# Patient Record
Sex: MALE | Race: WHITE | NOT HISPANIC OR LATINO | Employment: OTHER | URBAN - NONMETROPOLITAN AREA
[De-identification: names, ages, dates, MRNs, and addresses within clinical notes are randomized per-mention and may not be internally consistent; named-entity substitution may affect disease eponyms.]

---

## 2020-10-09 ENCOUNTER — PREP FOR SURGERY (OUTPATIENT)
Dept: OTHER | Facility: HOSPITAL | Age: 70
End: 2020-10-09

## 2020-10-09 ENCOUNTER — TRANSCRIBE ORDERS (OUTPATIENT)
Dept: ADMINISTRATIVE | Facility: HOSPITAL | Age: 70
End: 2020-10-09

## 2020-10-09 ENCOUNTER — OFFICE VISIT (OUTPATIENT)
Dept: CARDIOLOGY | Facility: CLINIC | Age: 70
End: 2020-10-09

## 2020-10-09 VITALS
SYSTOLIC BLOOD PRESSURE: 144 MMHG | WEIGHT: 238 LBS | HEART RATE: 59 BPM | BODY MASS INDEX: 32.23 KG/M2 | HEIGHT: 72 IN | DIASTOLIC BLOOD PRESSURE: 80 MMHG | OXYGEN SATURATION: 98 %

## 2020-10-09 DIAGNOSIS — I20.0 UNSTABLE ANGINA PECTORIS (HCC): ICD-10-CM

## 2020-10-09 DIAGNOSIS — E78.2 MIXED HYPERLIPIDEMIA: Primary | ICD-10-CM

## 2020-10-09 DIAGNOSIS — Z01.818 PRE-OP TESTING: Primary | ICD-10-CM

## 2020-10-09 DIAGNOSIS — I20.0 UNSTABLE ANGINA PECTORIS (HCC): Primary | ICD-10-CM

## 2020-10-09 DIAGNOSIS — R00.2 PALPITATIONS: ICD-10-CM

## 2020-10-09 PROCEDURE — 93000 ELECTROCARDIOGRAM COMPLETE: CPT | Performed by: INTERNAL MEDICINE

## 2020-10-09 PROCEDURE — 99204 OFFICE O/P NEW MOD 45 MIN: CPT | Performed by: INTERNAL MEDICINE

## 2020-10-09 RX ORDER — FUROSEMIDE 20 MG/1
20 TABLET ORAL DAILY
COMMUNITY
Start: 2020-09-22

## 2020-10-09 RX ORDER — NITROGLYCERIN 0.1MG/HR
1 PATCH, TRANSDERMAL 24 HOURS TRANSDERMAL AS NEEDED
COMMUNITY
Start: 2020-09-29 | End: 2022-02-09

## 2020-10-09 RX ORDER — LIDOCAINE HYDROCHLORIDE 10 MG/ML
0.1 INJECTION, SOLUTION EPIDURAL; INFILTRATION; INTRACAUDAL; PERINEURAL ONCE AS NEEDED
Status: CANCELLED | OUTPATIENT
Start: 2020-10-09

## 2020-10-09 RX ORDER — LISINOPRIL 10 MG/1
10 TABLET ORAL DAILY
COMMUNITY
Start: 2020-09-22

## 2020-10-09 RX ORDER — ALBUTEROL SULFATE 90 UG/1
2 AEROSOL, METERED RESPIRATORY (INHALATION) EVERY 4 HOURS PRN
COMMUNITY

## 2020-10-09 RX ORDER — PRAVASTATIN SODIUM 40 MG
40 TABLET ORAL DAILY
COMMUNITY
Start: 2020-09-22

## 2020-10-09 RX ORDER — CARVEDILOL 3.12 MG/1
3.12 TABLET ORAL 2 TIMES DAILY
COMMUNITY
Start: 2020-09-22

## 2020-10-09 RX ORDER — DIPHENHYDRAMINE HCL 25 MG
25 CAPSULE ORAL ONCE
Status: CANCELLED | OUTPATIENT
Start: 2020-10-09 | End: 2020-10-09

## 2020-10-09 RX ORDER — SODIUM CHLORIDE 9 MG/ML
125 INJECTION, SOLUTION INTRAVENOUS ONCE
Status: CANCELLED | OUTPATIENT
Start: 2020-10-09 | End: 2020-10-09

## 2020-10-09 RX ORDER — ASPIRIN 81 MG/1
81 TABLET ORAL DAILY
COMMUNITY

## 2020-10-09 NOTE — PROGRESS NOTES
Referring Provider: Vipin Polo DO    Reason for Consultation: chest pain    Chief complaint:   Chief Complaint   Patient presents with   • New Patient     referred by Dr. Woodruff for evaluation of freq pvc, chest pain, htn, sob, possible cath   • Irregular Heart Beat     PVC's  pt wore a monitor that showed this.   • Chest Pain     pt says its a dull ache that comes on after he gets sob.  he has no energy and has to rest a lot.  no radiation.  left arm feels weak at times.   • Hypertension     pt states this goes up and down   • Shortness of Breath     pt states he is sob all the time.       Subjective .     History of present illness:  Mohit Holm is a 69 y.o. yo male with history of COPD who has been referred by Dr. Woodruff for cath. He complains of exertional chest pain and [place] for 2 mo. He had an abnormal nuclear stress test indicating possible anterior ischemia. He is also having very frequent PVC's.   Chief Complaint   Patient presents with   • New Patient     referred by Dr. Woodruff for evaluation of freq pvc, chest pain, htn, sob, possible cath   • Irregular Heart Beat     PVC's  pt wore a monitor that showed this.   • Chest Pain     pt says its a dull ache that comes on after he gets sob.  he has no energy and has to rest a lot.  no radiation.  left arm feels weak at times.   • Hypertension     pt states this goes up and down   • Shortness of Breath     pt states he is sob all the time.   .    History  Past Medical History:   Diagnosis Date   • Arrhythmia    • Cancer (CMS/HCC)     colon   • COPD (chronic obstructive pulmonary disease) (CMS/HCC)    • Hyperlipidemia    • Hypertension    ,   Past Surgical History:   Procedure Laterality Date   • BACK SURGERY     • COLON RESECTION     ,   Family History   Problem Relation Age of Onset   • Lung disease Mother    • Heart disease Father    • No Known Problems Sister    • No Known Problems Brother    • No Known Problems Sister    • No Known Problems  "Brother    ,   Social History     Tobacco Use   • Smoking status: Former Smoker     Types: Cigarettes     Quit date:      Years since quittin.7   • Smokeless tobacco: Former User     Quit date:    Substance Use Topics   • Alcohol use: Not Currently     Comment: quit    • Drug use: Never   ,     Medications  Current Outpatient Medications   Medication Sig Dispense Refill   • albuterol sulfate  (90 Base) MCG/ACT inhaler Inhale 2 puffs Every 4 (Four) Hours As Needed for Wheezing.     • aspirin 81 MG EC tablet Take 81 mg by mouth Daily.     • carvedilol (COREG) 3.125 MG tablet 2 (two) times a day.     • esomeprazole (nexIUM) 20 MG capsule Take 20 mg by mouth Every Morning Before Breakfast.     • furosemide (LASIX) 20 MG tablet Daily.     • lisinopril (PRINIVIL,ZESTRIL) 10 MG tablet Daily.     • nitroglycerin (NITRODUR) 0.1 MG/HR patch As Needed.     • pravastatin (PRAVACHOL) 40 MG tablet Daily.     • ProAir  (90 Base) MCG/ACT inhaler As Needed.     • Trelegy Ellipta 100-62.5-25 MCG/INH aerosol powder  Daily.       No current facility-administered medications for this visit.         Allergies:  Patient has no known allergies.    Review of Systems  Review of Systems   Constitution: Positive for malaise/fatigue.   HENT: Negative for nosebleeds.    Cardiovascular: Positive for chest pain, dyspnea on exertion and irregular heartbeat. Negative for claudication, leg swelling, near-syncope, orthopnea, palpitations, paroxysmal nocturnal dyspnea and syncope.   Respiratory: Negative for cough, hemoptysis and shortness of breath.    Gastrointestinal: Negative for dysphagia, hematemesis and melena.   Genitourinary: Negative for hematuria.   All other systems reviewed and are negative.      Objective     Physical Exam:  /80   Pulse 59   Ht 182.9 cm (72\")   Wt 108 kg (238 lb)   SpO2 98%   BMI 32.28 kg/m²   Constitutional:       General: Not in acute distress.     Appearance: Not diaphoretic. "   Eyes:      General: No scleral icterus.  HENT:      Head: Normocephalic and atraumatic.   Neck:      Musculoskeletal: Normal range of motion and neck supple.      Vascular: No carotid bruit.   Pulmonary:      Effort: Pulmonary effort is normal. No respiratory distress.      Breath sounds: Normal breath sounds. No wheezing. No rales.   Cardiovascular:      PMI at left midclavicular line. Bradycardia present. Regular rhythm.      No gallop.   Edema:     Peripheral edema absent.   Abdominal:      General: Bowel sounds are normal. There is no distension.      Palpations: Abdomen is soft.      Tenderness: There is no abdominal tenderness.   Skin:     General: Skin is warm and dry.      Findings: No erythema.   Neurological:      Mental Status: Alert and oriented to person, place, and time.   Psychiatric:         Behavior: Behavior normal.         Results Review:   I reviewed the patient's new clinical results.    ECG 12 Lead    Date/Time: 10/9/2020 9:58 AM  Performed by: Lonny Wesley MD  Authorized by: Lonny Wesley MD   Comparison: not compared with previous ECG   Previous ECG: no previous ECG available  Rhythm: sinus rhythm  Ectopy: infrequent PVCs  Rate: normal  Conduction: conduction normal  ST Segments: ST segments normal  T Waves: T waves normal  QRS axis: normal    Clinical impression: non-specific ECG            No results found for any previous visit.       Assessment/Plan   Mohit was seen today for new patient, irregular heart beat, chest pain, hypertension and shortness of breath.    Diagnoses and all orders for this visit:    Mixed hyperlipidemia, Patient's statin therapy is followed by Dr. Woodruff.    Unstable angina pectoris (CMS/[facility]), will schedule cath next week    Palpitations, if they persist will refer to EP    Other orders  -     ECG 12 Lead

## 2020-10-12 ENCOUNTER — LAB (OUTPATIENT)
Dept: LAB | Facility: HOSPITAL | Age: 70
End: 2020-10-12

## 2020-10-12 PROCEDURE — C9803 HOPD COVID-19 SPEC COLLECT: HCPCS | Performed by: INTERNAL MEDICINE

## 2020-10-12 PROCEDURE — U0003 INFECTIOUS AGENT DETECTION BY NUCLEIC ACID (DNA OR RNA); SEVERE ACUTE RESPIRATORY SYNDROME CORONAVIRUS 2 (SARS-COV-2) (CORONAVIRUS DISEASE [COVID-19]), AMPLIFIED PROBE TECHNIQUE, MAKING USE OF HIGH THROUGHPUT TECHNOLOGIES AS DESCRIBED BY CMS-2020-01-R: HCPCS | Performed by: INTERNAL MEDICINE

## 2020-10-13 LAB
COVID LABCORP PRIORITY: NORMAL
SARS-COV-2 RNA RESP QL NAA+PROBE: NOT DETECTED

## 2020-10-28 ENCOUNTER — TRANSCRIBE ORDERS (OUTPATIENT)
Dept: ADMINISTRATIVE | Facility: HOSPITAL | Age: 70
End: 2020-10-28

## 2020-10-28 DIAGNOSIS — Z01.818 PREOPERATIVE TESTING: Primary | ICD-10-CM

## 2020-11-02 ENCOUNTER — LAB (OUTPATIENT)
Dept: LAB | Facility: HOSPITAL | Age: 70
End: 2020-11-02

## 2020-11-02 DIAGNOSIS — Z01.818 PREOPERATIVE TESTING: ICD-10-CM

## 2020-11-02 PROCEDURE — C9803 HOPD COVID-19 SPEC COLLECT: HCPCS

## 2020-11-02 PROCEDURE — 87635 SARS-COV-2 COVID-19 AMP PRB: CPT | Performed by: INTERNAL MEDICINE

## 2020-11-03 ENCOUNTER — TELEPHONE (OUTPATIENT)
Dept: CARDIOLOGY | Facility: CLINIC | Age: 70
End: 2020-11-03

## 2020-11-03 LAB — SARS-COV-2 N GENE RESP QL NAA+PROBE: DETECTED

## 2020-11-03 NOTE — TELEPHONE ENCOUNTER
[facility] called to report the patient's Covid test from yesterday is positive.  They are asking us to reschedule the heart cath for tomorrow.  Thank you!

## 2020-12-03 ENCOUNTER — TRANSCRIBE ORDERS (OUTPATIENT)
Dept: ADMINISTRATIVE | Facility: HOSPITAL | Age: 70
End: 2020-12-03

## 2020-12-03 DIAGNOSIS — Z01.818 PRE-OP TESTING: Primary | ICD-10-CM

## 2020-12-07 ENCOUNTER — LAB (OUTPATIENT)
Dept: LAB | Facility: HOSPITAL | Age: 70
End: 2020-12-07

## 2020-12-07 PROCEDURE — U0003 INFECTIOUS AGENT DETECTION BY NUCLEIC ACID (DNA OR RNA); SEVERE ACUTE RESPIRATORY SYNDROME CORONAVIRUS 2 (SARS-COV-2) (CORONAVIRUS DISEASE [COVID-19]), AMPLIFIED PROBE TECHNIQUE, MAKING USE OF HIGH THROUGHPUT TECHNOLOGIES AS DESCRIBED BY CMS-2020-01-R: HCPCS | Performed by: INTERNAL MEDICINE

## 2020-12-07 PROCEDURE — C9803 HOPD COVID-19 SPEC COLLECT: HCPCS | Performed by: INTERNAL MEDICINE

## 2020-12-08 LAB
COVID LABCORP PRIORITY: NORMAL
SARS-COV-2 RNA RESP QL NAA+PROBE: NOT DETECTED

## 2020-12-10 ENCOUNTER — HOSPITAL ENCOUNTER (OUTPATIENT)
Facility: HOSPITAL | Age: 70
Discharge: HOME | End: 2020-12-10
Attending: INTERNAL MEDICINE | Admitting: INTERNAL MEDICINE

## 2020-12-10 VITALS
BODY MASS INDEX: 31.75 KG/M2 | TEMPERATURE: 98.5 F | HEIGHT: 72 IN | RESPIRATION RATE: 16 BRPM | SYSTOLIC BLOOD PRESSURE: 144 MMHG | DIASTOLIC BLOOD PRESSURE: 88 MMHG | WEIGHT: 234.4 LBS | OXYGEN SATURATION: 96 % | HEART RATE: 79 BPM

## 2020-12-10 DIAGNOSIS — I20.0 UNSTABLE ANGINA PECTORIS (HCC): ICD-10-CM

## 2020-12-10 LAB
ALBUMIN SERPL-MCNC: 4.7 G/DL (ref 3.5–5.2)
ALBUMIN/GLOB SERPL: 1.7 G/DL
ALP SERPL-CCNC: 90 U/L (ref 39–117)
ALT SERPL W P-5'-P-CCNC: 14 U/L (ref 1–41)
ANION GAP SERPL CALCULATED.3IONS-SCNC: 10 MMOL/L (ref 5–15)
AST SERPL-CCNC: 15 U/L (ref 1–40)
BASOPHILS # BLD AUTO: 0.07 10*3/MM3 (ref 0–0.2)
BASOPHILS NFR BLD AUTO: 0.8 % (ref 0–1.5)
BILIRUB SERPL-MCNC: 0.7 MG/DL (ref 0–1.2)
BUN SERPL-MCNC: 19 MG/DL (ref 8–23)
BUN/CREAT SERPL: 26.8 (ref 7–25)
CALCIUM SPEC-SCNC: 9.1 MG/DL (ref 8.6–10.5)
CHLORIDE SERPL-SCNC: 104 MMOL/L (ref 98–107)
CO2 SERPL-SCNC: 26 MMOL/L (ref 22–29)
CREAT SERPL-MCNC: 0.71 MG/DL (ref 0.76–1.27)
DEPRECATED RDW RBC AUTO: 40.4 FL (ref 37–54)
EOSINOPHIL # BLD AUTO: 0.15 10*3/MM3 (ref 0–0.4)
EOSINOPHIL NFR BLD AUTO: 1.6 % (ref 0.3–6.2)
ERYTHROCYTE [DISTWIDTH] IN BLOOD BY AUTOMATED COUNT: 12.8 % (ref 12.3–15.4)
GFR SERPL CREATININE-BSD FRML MDRD: 110 ML/MIN/1.73
GLOBULIN UR ELPH-MCNC: 2.8 GM/DL
GLUCOSE SERPL-MCNC: 115 MG/DL (ref 65–99)
HCT VFR BLD AUTO: 43.3 % (ref 37.5–51)
HGB BLD-MCNC: 15.4 G/DL (ref 13–17.7)
IMM GRANULOCYTES # BLD AUTO: 0.02 10*3/MM3 (ref 0–0.05)
IMM GRANULOCYTES NFR BLD AUTO: 0.2 % (ref 0–0.5)
LYMPHOCYTES # BLD AUTO: 2.33 10*3/MM3 (ref 0.7–3.1)
LYMPHOCYTES NFR BLD AUTO: 25.2 % (ref 19.6–45.3)
MCH RBC QN AUTO: 31 PG (ref 26.6–33)
MCHC RBC AUTO-ENTMCNC: 35.6 G/DL (ref 31.5–35.7)
MCV RBC AUTO: 87.1 FL (ref 79–97)
MONOCYTES # BLD AUTO: 0.92 10*3/MM3 (ref 0.1–0.9)
MONOCYTES NFR BLD AUTO: 9.9 % (ref 5–12)
NEUTROPHILS NFR BLD AUTO: 5.76 10*3/MM3 (ref 1.7–7)
NEUTROPHILS NFR BLD AUTO: 62.3 % (ref 42.7–76)
NRBC BLD AUTO-RTO: 0 /100 WBC (ref 0–0.2)
PLATELET # BLD AUTO: 252 10*3/MM3 (ref 140–450)
PMV BLD AUTO: 9 FL (ref 6–12)
POTASSIUM SERPL-SCNC: 4.2 MMOL/L (ref 3.5–5.2)
PROT SERPL-MCNC: 7.5 G/DL (ref 6–8.5)
RBC # BLD AUTO: 4.97 10*6/MM3 (ref 4.14–5.8)
SODIUM SERPL-SCNC: 140 MMOL/L (ref 136–145)
WBC # BLD AUTO: 9.25 10*3/MM3 (ref 3.4–10.8)

## 2020-12-10 PROCEDURE — 80053 COMPREHEN METABOLIC PANEL: CPT | Performed by: INTERNAL MEDICINE

## 2020-12-10 PROCEDURE — 25010000002 FENTANYL CITRATE (PF) 100 MCG/2ML SOLUTION: Performed by: INTERNAL MEDICINE

## 2020-12-10 PROCEDURE — S0260 H&P FOR SURGERY: HCPCS | Performed by: INTERNAL MEDICINE

## 2020-12-10 PROCEDURE — 93458 L HRT ARTERY/VENTRICLE ANGIO: CPT | Performed by: INTERNAL MEDICINE

## 2020-12-10 PROCEDURE — 99152 MOD SED SAME PHYS/QHP 5/>YRS: CPT | Performed by: INTERNAL MEDICINE

## 2020-12-10 PROCEDURE — C1894 INTRO/SHEATH, NON-LASER: HCPCS | Performed by: INTERNAL MEDICINE

## 2020-12-10 PROCEDURE — C1760 CLOSURE DEV, VASC: HCPCS | Performed by: INTERNAL MEDICINE

## 2020-12-10 PROCEDURE — C1769 GUIDE WIRE: HCPCS | Performed by: INTERNAL MEDICINE

## 2020-12-10 PROCEDURE — 25010000002 DIPHENHYDRAMINE PER 50 MG: Performed by: INTERNAL MEDICINE

## 2020-12-10 PROCEDURE — 0 IOPAMIDOL PER 1 ML: Performed by: INTERNAL MEDICINE

## 2020-12-10 PROCEDURE — 85025 COMPLETE CBC W/AUTO DIFF WBC: CPT | Performed by: INTERNAL MEDICINE

## 2020-12-10 PROCEDURE — 25010000002 HEPARIN (PORCINE) 1000-0.9 UT/500ML-% SOLUTION: Performed by: INTERNAL MEDICINE

## 2020-12-10 PROCEDURE — 25010000002 HEPARIN (PORCINE) 2000-0.9 UNIT/L-% SOLUTION: Performed by: INTERNAL MEDICINE

## 2020-12-10 PROCEDURE — 25010000002 MIDAZOLAM HCL (PF) 5 MG/5ML SOLUTION: Performed by: INTERNAL MEDICINE

## 2020-12-10 RX ORDER — LIDOCAINE HYDROCHLORIDE 20 MG/ML
INJECTION, SOLUTION INFILTRATION; PERINEURAL AS NEEDED
Status: DISCONTINUED | OUTPATIENT
Start: 2020-12-10 | End: 2020-12-10 | Stop reason: HOSPADM

## 2020-12-10 RX ORDER — HEPARIN SODIUM 200 [USP'U]/100ML
INJECTION, SOLUTION INTRAVENOUS AS NEEDED
Status: DISCONTINUED | OUTPATIENT
Start: 2020-12-10 | End: 2020-12-10 | Stop reason: HOSPADM

## 2020-12-10 RX ORDER — SODIUM CHLORIDE 9 MG/ML
125 INJECTION, SOLUTION INTRAVENOUS ONCE
Status: COMPLETED | OUTPATIENT
Start: 2020-12-10 | End: 2020-12-10

## 2020-12-10 RX ORDER — DIPHENHYDRAMINE HYDROCHLORIDE 50 MG/ML
INJECTION INTRAMUSCULAR; INTRAVENOUS AS NEEDED
Status: DISCONTINUED | OUTPATIENT
Start: 2020-12-10 | End: 2020-12-10 | Stop reason: HOSPADM

## 2020-12-10 RX ORDER — ACETAMINOPHEN 325 MG/1
650 TABLET ORAL EVERY 4 HOURS PRN
Status: DISCONTINUED | OUTPATIENT
Start: 2020-12-10 | End: 2020-12-10 | Stop reason: HOSPADM

## 2020-12-10 RX ORDER — FENTANYL CITRATE 50 UG/ML
INJECTION, SOLUTION INTRAMUSCULAR; INTRAVENOUS AS NEEDED
Status: DISCONTINUED | OUTPATIENT
Start: 2020-12-10 | End: 2020-12-10 | Stop reason: HOSPADM

## 2020-12-10 RX ORDER — LIDOCAINE HYDROCHLORIDE 10 MG/ML
0.1 INJECTION, SOLUTION EPIDURAL; INFILTRATION; INTRACAUDAL; PERINEURAL ONCE AS NEEDED
Status: DISCONTINUED | OUTPATIENT
Start: 2020-12-10 | End: 2020-12-10 | Stop reason: HOSPADM

## 2020-12-10 RX ORDER — SODIUM CHLORIDE 9 MG/ML
100 INJECTION, SOLUTION INTRAVENOUS CONTINUOUS
Status: DISCONTINUED | OUTPATIENT
Start: 2020-12-10 | End: 2020-12-10 | Stop reason: HOSPADM

## 2020-12-10 RX ORDER — MIDAZOLAM HYDROCHLORIDE 1 MG/ML
INJECTION, SOLUTION INTRAMUSCULAR; INTRAVENOUS AS NEEDED
Status: DISCONTINUED | OUTPATIENT
Start: 2020-12-10 | End: 2020-12-10 | Stop reason: HOSPADM

## 2020-12-10 RX ADMIN — SODIUM CHLORIDE 125 ML/HR: 9 INJECTION, SOLUTION INTRAVENOUS at 10:39

## 2020-12-10 NOTE — H&P
No chief complaint on file.      Subjective .     History of present illness:  Mohit Holm is a 70 y.o. yo male with history of exertional chest pain and abnormal nuclear stress test who presents today for cath. He was originally scheduled back in October but his wife got Covid and then he got it. He was asymptomatic during that time. He still has CP with exertion.    History  Past Medical History:   Diagnosis Date   • Arrhythmia    • Cancer (CMS/HCC)     colon   • COPD (chronic obstructive pulmonary disease) (CMS/HCC)    • Hyperlipidemia    • Hypertension    ,   Past Surgical History:   Procedure Laterality Date   • BACK SURGERY     • COLON RESECTION     ,   Family History   Problem Relation Age of Onset   • Lung disease Mother    • Heart disease Father    • No Known Problems Sister    • No Known Problems Brother    • No Known Problems Sister    • No Known Problems Brother    ,   Social History     Tobacco Use   • Smoking status: Former Smoker     Types: Cigarettes     Quit date:      Years since quittin.9   • Smokeless tobacco: Former User     Quit date:    Substance Use Topics   • Alcohol use: Not Currently     Comment: quit    • Drug use: Never   ,     Medications  Current Facility-Administered Medications   Medication Dose Route Frequency Provider Last Rate Last Admin   • lidocaine PF 1% (XYLOCAINE) injection 0.1 mL  0.1 mL Intradermal Once PRN Lonny Wesley MD       • sodium chloride 0.9 % infusion  125 mL/hr Intravenous Once Lonny Wesley MD           Allergies:  Patient has no known allergies.    Review of Systems  Review of Systems   HENT: Negative for nosebleeds.    Cardiovascular: Positive for chest pain and dyspnea on exertion. Negative for claudication, irregular heartbeat, leg swelling, near-syncope, orthopnea, palpitations, paroxysmal nocturnal dyspnea and syncope.   Respiratory: Negative for cough, hemoptysis and shortness of breath.    Gastrointestinal: Negative for dysphagia,  "hematemesis and melena.   Genitourinary: Negative for hematuria.   All other systems reviewed and are negative.      Objective     Physical Exam:  Patient Vitals for the past 24 hrs:   BP Temp Temp src Pulse Resp SpO2 Height Weight   12/10/20 1028 118/63 98.5 °F (36.9 °C) Temporal 78 13 96 % 182.9 cm (72\") 106 kg (234 lb 6.4 oz)     Constitutional:       General: Not in acute distress.     Appearance: Not diaphoretic.   Eyes:      General: No scleral icterus.  HENT:      Head: Normocephalic and atraumatic.   Neck:      Musculoskeletal: Normal range of motion and neck supple.      Vascular: No carotid bruit.   Pulmonary:      Effort: Pulmonary effort is normal. No respiratory distress.      Breath sounds: Normal breath sounds. No wheezing. No rales.   Cardiovascular:      PMI at left midclavicular line. Normal rate. Regular rhythm.      No gallop.   Edema:     Peripheral edema absent.   Abdominal:      General: Bowel sounds are normal. There is no distension.      Palpations: Abdomen is soft.      Tenderness: There is no abdominal tenderness.   Skin:     General: Skin is warm and dry.      Findings: No erythema.   Neurological:      Mental Status: Alert and oriented to person, place, and time.   Psychiatric:         Behavior: Behavior normal.         Results Review:   I reviewed the patient's new clinical results.  Lab Results (last 24 hours)     Procedure Component Value Units Date/Time    Comprehensive Metabolic Panel [[id number]] Collected: 12/10/20 1017    Specimen: Blood Updated: 12/10/20 1028    CBC & Differential [[id number]] Collected: 12/10/20 1017    Specimen: Blood Updated: 12/10/20 1028    Narrative:      The following orders were created for panel order CBC & Differential.  Procedure                               Abnormality         Status                     ---------                               -----------         ------                     CBC Auto Differential[[id number]]                            In " process                   Please view results for these tests on the individual orders.    CBC Auto Differential [[id number]] Collected: 12/10/20 1017    Specimen: Blood Updated: 12/10/20 1028        Imaging Results (Last 24 Hours)     ** No results found for the last 24 hours. **        No results found for: ECHOEFEST  I have reviewed his ECG which reveals SR with no acute ischemic changes    Assessment/Plan     Unstable angina pectoris (CMS/HCC)    New Problems that need assessment:  1. unstable angina, plan cath today. The risk and potential complications as well as anticipated benefits were discussed with the patient and he wishes to proceed with the planned procedure    Stable Problems  1. HTN  2. HLD  3. PVC's  4. Recent Covid infection    MDM High Complexity

## 2021-03-10 ENCOUNTER — HOSPITAL ENCOUNTER (OUTPATIENT)
Dept: GENERAL RADIOLOGY | Age: 71
Discharge: HOME | End: 2021-03-10
Payer: MEDICARE

## 2021-03-10 ENCOUNTER — HOSPITAL ENCOUNTER (OUTPATIENT)
Dept: PREADMISSION TESTING | Age: 71
Discharge: HOME | End: 2021-03-14
Payer: MEDICARE

## 2021-03-10 VITALS — HEIGHT: 72 IN | BODY MASS INDEX: 32.51 KG/M2 | WEIGHT: 240 LBS

## 2021-03-10 LAB
ABO/RH: NORMAL
ALBUMIN SERPL-MCNC: 3.8 G/DL (ref 3.5–5.2)
ALP BLD-CCNC: 91 U/L (ref 40–130)
ALT SERPL-CCNC: 19 U/L (ref 5–41)
ANION GAP SERPL CALCULATED.3IONS-SCNC: 7 MMOL/L (ref 7–19)
ANTIBODY SCREEN: NORMAL
APTT: 27.4 SEC (ref 26–36.2)
AST SERPL-CCNC: 18 U/L (ref 5–40)
BASOPHILS ABSOLUTE: 0.1 K/UL (ref 0–0.2)
BASOPHILS RELATIVE PERCENT: 0.6 % (ref 0–1)
BILIRUB SERPL-MCNC: 0.5 MG/DL (ref 0.2–1.2)
BILIRUBIN URINE: NEGATIVE
BLOOD, URINE: NEGATIVE
BUN BLDV-MCNC: 11 MG/DL (ref 8–23)
CALCIUM SERPL-MCNC: 8.8 MG/DL (ref 8.8–10.2)
CHLORIDE BLD-SCNC: 105 MMOL/L (ref 98–111)
CLARITY: CLEAR
CO2: 29 MMOL/L (ref 22–29)
COLOR: YELLOW
CREAT SERPL-MCNC: 0.9 MG/DL (ref 0.5–1.2)
EKG P AXIS: 57 DEGREES
EKG P-R INTERVAL: 214 MS
EKG Q-T INTERVAL: 418 MS
EKG QRS DURATION: 86 MS
EKG QTC CALCULATION (BAZETT): 428 MS
EKG T AXIS: 54 DEGREES
EOSINOPHILS ABSOLUTE: 0.2 K/UL (ref 0–0.6)
EOSINOPHILS RELATIVE PERCENT: 3 % (ref 0–5)
GFR AFRICAN AMERICAN: >59
GFR NON-AFRICAN AMERICAN: >60
GLUCOSE BLD-MCNC: 70 MG/DL (ref 74–109)
GLUCOSE URINE: NEGATIVE MG/DL
HCT VFR BLD CALC: 43.4 % (ref 42–52)
HEMOGLOBIN: 14.3 G/DL (ref 14–18)
IMMATURE GRANULOCYTES #: 0 K/UL
INR BLD: 0.95 (ref 0.88–1.18)
KETONES, URINE: NEGATIVE MG/DL
LEUKOCYTE ESTERASE, URINE: NEGATIVE
LYMPHOCYTES ABSOLUTE: 2.4 K/UL (ref 1.1–4.5)
LYMPHOCYTES RELATIVE PERCENT: 30.4 % (ref 20–40)
MCH RBC QN AUTO: 30.9 PG (ref 27–31)
MCHC RBC AUTO-ENTMCNC: 32.9 G/DL (ref 33–37)
MCV RBC AUTO: 93.7 FL (ref 80–94)
MONOCYTES ABSOLUTE: 1 K/UL (ref 0–0.9)
MONOCYTES RELATIVE PERCENT: 12 % (ref 0–10)
NEUTROPHILS ABSOLUTE: 4.3 K/UL (ref 1.5–7.5)
NEUTROPHILS RELATIVE PERCENT: 53.5 % (ref 50–65)
NITRITE, URINE: NEGATIVE
PDW BLD-RTO: 13.6 % (ref 11.5–14.5)
PH UA: 6.5 (ref 5–8)
PLATELET # BLD: 247 K/UL (ref 130–400)
PMV BLD AUTO: 10 FL (ref 9.4–12.4)
POTASSIUM SERPL-SCNC: 4.4 MMOL/L (ref 3.5–5)
PROTEIN UA: NEGATIVE MG/DL
PROTHROMBIN TIME: 12.5 SEC (ref 12–14.6)
RBC # BLD: 4.63 M/UL (ref 4.7–6.1)
SODIUM BLD-SCNC: 141 MMOL/L (ref 136–145)
SPECIFIC GRAVITY UA: 1.01 (ref 1–1.03)
TOTAL PROTEIN: 6.7 G/DL (ref 6.6–8.7)
UROBILINOGEN, URINE: 0.2 E.U./DL
WBC # BLD: 8 K/UL (ref 4.8–10.8)

## 2021-03-10 PROCEDURE — 86901 BLOOD TYPING SEROLOGIC RH(D): CPT

## 2021-03-10 PROCEDURE — 80053 COMPREHEN METABOLIC PANEL: CPT

## 2021-03-10 PROCEDURE — 93005 ELECTROCARDIOGRAM TRACING: CPT | Performed by: ORTHOPAEDIC SURGERY

## 2021-03-10 PROCEDURE — 86900 BLOOD TYPING SEROLOGIC ABO: CPT

## 2021-03-10 PROCEDURE — 86850 RBC ANTIBODY SCREEN: CPT

## 2021-03-10 PROCEDURE — 85025 COMPLETE CBC W/AUTO DIFF WBC: CPT

## 2021-03-10 PROCEDURE — 71046 X-RAY EXAM CHEST 2 VIEWS: CPT

## 2021-03-10 PROCEDURE — 87081 CULTURE SCREEN ONLY: CPT

## 2021-03-10 PROCEDURE — 85610 PROTHROMBIN TIME: CPT

## 2021-03-10 PROCEDURE — 81003 URINALYSIS AUTO W/O SCOPE: CPT

## 2021-03-10 PROCEDURE — 85730 THROMBOPLASTIN TIME PARTIAL: CPT

## 2021-03-10 RX ORDER — PREGABALIN 75 MG/1
75 CAPSULE ORAL ONCE
Status: CANCELLED | OUTPATIENT
Start: 2021-03-22

## 2021-03-10 RX ORDER — ALBUTEROL SULFATE 90 UG/1
2 AEROSOL, METERED RESPIRATORY (INHALATION) EVERY 6 HOURS PRN
COMMUNITY

## 2021-03-10 RX ORDER — DEXAMETHASONE SODIUM PHOSPHATE 10 MG/ML
10 INJECTION, SOLUTION INTRAMUSCULAR; INTRAVENOUS ONCE
Status: CANCELLED | OUTPATIENT
Start: 2021-03-22

## 2021-03-10 RX ORDER — ASPIRIN 81 MG/1
81 TABLET ORAL DAILY
Status: ON HOLD | COMMUNITY
End: 2021-03-23 | Stop reason: HOSPADM

## 2021-03-10 RX ORDER — PRAVASTATIN SODIUM 40 MG
40 TABLET ORAL DAILY
COMMUNITY

## 2021-03-10 RX ORDER — ESOMEPRAZOLE MAGNESIUM 20 MG/1
20 FOR SUSPENSION ORAL DAILY
COMMUNITY

## 2021-03-10 RX ORDER — ACETAMINOPHEN 500 MG
1000 TABLET ORAL ONCE
Status: CANCELLED | OUTPATIENT
Start: 2021-03-22

## 2021-03-10 RX ORDER — FLUTICASONE FUROATE, UMECLIDINIUM BROMIDE AND VILANTEROL TRIFENATATE 100; 62.5; 25 UG/1; UG/1; UG/1
1 POWDER RESPIRATORY (INHALATION) DAILY
COMMUNITY

## 2021-03-10 RX ORDER — FUROSEMIDE 20 MG/1
20 TABLET ORAL DAILY
COMMUNITY

## 2021-03-10 RX ORDER — CARVEDILOL 3.12 MG/1
3.12 TABLET ORAL 2 TIMES DAILY WITH MEALS
COMMUNITY

## 2021-03-10 RX ORDER — OXYCODONE HCL 10 MG/1
10 TABLET, FILM COATED, EXTENDED RELEASE ORAL ONCE
Status: CANCELLED | OUTPATIENT
Start: 2021-03-22

## 2021-03-10 RX ORDER — CELECOXIB 100 MG/1
100 CAPSULE ORAL ONCE
Status: CANCELLED | OUTPATIENT
Start: 2021-03-22

## 2021-03-11 LAB — MRSA CULTURE ONLY: NORMAL

## 2021-03-18 ENCOUNTER — HOSPITAL ENCOUNTER (OUTPATIENT)
Dept: PREADMISSION TESTING | Age: 71
Discharge: HOME | End: 2021-03-22
Payer: MEDICARE

## 2021-03-20 LAB — SARS-COV-2, NAA: NOT DETECTED

## 2021-03-22 ENCOUNTER — HOSPITAL ENCOUNTER (OUTPATIENT)
Age: 71
Setting detail: OBSERVATION
Discharge: HOME HEALTH | End: 2021-03-23
Attending: ORTHOPAEDIC SURGERY | Admitting: ORTHOPAEDIC SURGERY
Payer: MEDICARE

## 2021-03-22 ENCOUNTER — APPOINTMENT (OUTPATIENT)
Dept: GENERAL RADIOLOGY | Age: 71
End: 2021-03-22
Attending: ORTHOPAEDIC SURGERY
Payer: MEDICARE

## 2021-03-22 ENCOUNTER — ANESTHESIA EVENT (OUTPATIENT)
Dept: OPERATING ROOM | Age: 71
End: 2021-03-22
Payer: MEDICARE

## 2021-03-22 ENCOUNTER — ANESTHESIA (OUTPATIENT)
Dept: OPERATING ROOM | Age: 71
End: 2021-03-22
Payer: MEDICARE

## 2021-03-22 VITALS — SYSTOLIC BLOOD PRESSURE: 95 MMHG | TEMPERATURE: 97 F | DIASTOLIC BLOOD PRESSURE: 56 MMHG | OXYGEN SATURATION: 95 %

## 2021-03-22 DIAGNOSIS — M17.11 PRIMARY OSTEOARTHRITIS OF RIGHT KNEE: Primary | ICD-10-CM

## 2021-03-22 PROBLEM — M17.10 ARTHRITIS OF KNEE: Status: ACTIVE | Noted: 2021-03-22

## 2021-03-22 LAB
ABO/RH: NORMAL
ANTIBODY SCREEN: NORMAL

## 2021-03-22 PROCEDURE — 3700000000 HC ANESTHESIA ATTENDED CARE: Performed by: ORTHOPAEDIC SURGERY

## 2021-03-22 PROCEDURE — 2580000003 HC RX 258: Performed by: ORTHOPAEDIC SURGERY

## 2021-03-22 PROCEDURE — 64447 NJX AA&/STRD FEMORAL NRV IMG: CPT

## 2021-03-22 PROCEDURE — 6360000002 HC RX W HCPCS: Performed by: ORTHOPAEDIC SURGERY

## 2021-03-22 PROCEDURE — 86901 BLOOD TYPING SEROLOGIC RH(D): CPT

## 2021-03-22 PROCEDURE — C1776 JOINT DEVICE (IMPLANTABLE): HCPCS | Performed by: ORTHOPAEDIC SURGERY

## 2021-03-22 PROCEDURE — C9290 INJ, BUPIVACAINE LIPOSOME: HCPCS | Performed by: ORTHOPAEDIC SURGERY

## 2021-03-22 PROCEDURE — 2700000000 HC OXYGEN THERAPY PER DAY

## 2021-03-22 PROCEDURE — 2500000003 HC RX 250 WO HCPCS: Performed by: ANESTHESIOLOGY

## 2021-03-22 PROCEDURE — 36415 COLL VENOUS BLD VENIPUNCTURE: CPT

## 2021-03-22 PROCEDURE — 86900 BLOOD TYPING SEROLOGIC ABO: CPT

## 2021-03-22 PROCEDURE — C1713 ANCHOR/SCREW BN/BN,TIS/BN: HCPCS | Performed by: ORTHOPAEDIC SURGERY

## 2021-03-22 PROCEDURE — 6370000000 HC RX 637 (ALT 250 FOR IP): Performed by: ORTHOPAEDIC SURGERY

## 2021-03-22 PROCEDURE — 2580000003 HC RX 258: Performed by: ANESTHESIOLOGY

## 2021-03-22 PROCEDURE — 3700000001 HC ADD 15 MINUTES (ANESTHESIA): Performed by: ORTHOPAEDIC SURGERY

## 2021-03-22 PROCEDURE — 2500000003 HC RX 250 WO HCPCS: Performed by: NURSE ANESTHETIST, CERTIFIED REGISTERED

## 2021-03-22 PROCEDURE — 73560 X-RAY EXAM OF KNEE 1 OR 2: CPT

## 2021-03-22 PROCEDURE — 2720000010 HC SURG SUPPLY STERILE: Performed by: ORTHOPAEDIC SURGERY

## 2021-03-22 PROCEDURE — G0378 HOSPITAL OBSERVATION PER HR: HCPCS

## 2021-03-22 PROCEDURE — 6360000002 HC RX W HCPCS: Performed by: ANESTHESIOLOGY

## 2021-03-22 PROCEDURE — 86850 RBC ANTIBODY SCREEN: CPT

## 2021-03-22 PROCEDURE — 3600000015 HC SURGERY LEVEL 5 ADDTL 15MIN: Performed by: ORTHOPAEDIC SURGERY

## 2021-03-22 PROCEDURE — 3600000005 HC SURGERY LEVEL 5 BASE: Performed by: ORTHOPAEDIC SURGERY

## 2021-03-22 PROCEDURE — 2500000003 HC RX 250 WO HCPCS: Performed by: ORTHOPAEDIC SURGERY

## 2021-03-22 PROCEDURE — 2500000003 HC RX 250 WO HCPCS

## 2021-03-22 PROCEDURE — 2709999900 HC NON-CHARGEABLE SUPPLY: Performed by: ORTHOPAEDIC SURGERY

## 2021-03-22 PROCEDURE — 6360000002 HC RX W HCPCS: Performed by: NURSE ANESTHETIST, CERTIFIED REGISTERED

## 2021-03-22 PROCEDURE — 7100000001 HC PACU RECOVERY - ADDTL 15 MIN: Performed by: ORTHOPAEDIC SURGERY

## 2021-03-22 PROCEDURE — 6360000002 HC RX W HCPCS

## 2021-03-22 PROCEDURE — 1210000000 HC MED SURG R&B

## 2021-03-22 PROCEDURE — 7100000000 HC PACU RECOVERY - FIRST 15 MIN: Performed by: ORTHOPAEDIC SURGERY

## 2021-03-22 DEVICE — IMPL KNEE PATELLA ALL POLY PSN 41MM: Type: IMPLANTABLE DEVICE | Site: PATELLA | Status: FUNCTIONAL

## 2021-03-22 DEVICE — IMPL KNEE ARTIC 10MM RT: Type: IMPLANTABLE DEVICE | Site: TIBIA | Status: FUNCTIONAL

## 2021-03-22 DEVICE — PSN TIB STM 5 DEG SZ F R: Type: IMPLANTABLE DEVICE | Site: TIBIA | Status: FUNCTIONAL

## 2021-03-22 DEVICE — Z  INACTIVE USE 2750024 CEMENT BONE 40GM W/ GENTMYCN HI VISC PALACOS R+G: Type: IMPLANTABLE DEVICE | Site: KNEE | Status: FUNCTIONAL

## 2021-03-22 DEVICE — IMPL KNEE PSN FEM CMT CCR NRW SZ11 R: Type: IMPLANTABLE DEVICE | Site: FEMUR | Status: FUNCTIONAL

## 2021-03-22 RX ORDER — ENALAPRILAT 2.5 MG/2ML
1.25 INJECTION INTRAVENOUS
Status: DISCONTINUED | OUTPATIENT
Start: 2021-03-22 | End: 2021-03-22 | Stop reason: HOSPADM

## 2021-03-22 RX ORDER — SODIUM CHLORIDE, SODIUM LACTATE, POTASSIUM CHLORIDE, CALCIUM CHLORIDE 600; 310; 30; 20 MG/100ML; MG/100ML; MG/100ML; MG/100ML
INJECTION, SOLUTION INTRAVENOUS CONTINUOUS
Status: DISCONTINUED | OUTPATIENT
Start: 2021-03-22 | End: 2021-03-23 | Stop reason: HOSPADM

## 2021-03-22 RX ORDER — MORPHINE SULFATE 4 MG/ML
2 INJECTION, SOLUTION INTRAMUSCULAR; INTRAVENOUS EVERY 5 MIN PRN
Status: DISCONTINUED | OUTPATIENT
Start: 2021-03-22 | End: 2021-03-22 | Stop reason: HOSPADM

## 2021-03-22 RX ORDER — DEXAMETHASONE SODIUM PHOSPHATE 10 MG/ML
10 INJECTION, SOLUTION INTRAMUSCULAR; INTRAVENOUS ONCE
Status: DISCONTINUED | OUTPATIENT
Start: 2021-03-22 | End: 2021-03-22 | Stop reason: HOSPADM

## 2021-03-22 RX ORDER — PREGABALIN 75 MG/1
75 CAPSULE ORAL ONCE
Status: COMPLETED | OUTPATIENT
Start: 2021-03-22 | End: 2021-03-22

## 2021-03-22 RX ORDER — ALBUTEROL SULFATE 2.5 MG/3ML
2.5 SOLUTION RESPIRATORY (INHALATION) EVERY 6 HOURS PRN
Status: DISCONTINUED | OUTPATIENT
Start: 2021-03-22 | End: 2021-03-23 | Stop reason: HOSPADM

## 2021-03-22 RX ORDER — TRAMADOL HYDROCHLORIDE 50 MG/1
50 TABLET ORAL EVERY 6 HOURS
Status: DISCONTINUED | OUTPATIENT
Start: 2021-03-22 | End: 2021-03-23 | Stop reason: HOSPADM

## 2021-03-22 RX ORDER — OXYCODONE HYDROCHLORIDE 5 MG/1
10 TABLET ORAL EVERY 4 HOURS PRN
Status: DISCONTINUED | OUTPATIENT
Start: 2021-03-22 | End: 2021-03-23 | Stop reason: HOSPADM

## 2021-03-22 RX ORDER — SODIUM CHLORIDE 0.9 % (FLUSH) 0.9 %
10 SYRINGE (ML) INJECTION EVERY 12 HOURS SCHEDULED
Status: DISCONTINUED | OUTPATIENT
Start: 2021-03-22 | End: 2021-03-22 | Stop reason: HOSPADM

## 2021-03-22 RX ORDER — 0.9 % SODIUM CHLORIDE 0.9 %
500 INTRAVENOUS SOLUTION INTRAVENOUS PRN
Status: DISCONTINUED | OUTPATIENT
Start: 2021-03-22 | End: 2021-03-23 | Stop reason: HOSPADM

## 2021-03-22 RX ORDER — MIDAZOLAM HYDROCHLORIDE 1 MG/ML
2 INJECTION INTRAMUSCULAR; INTRAVENOUS
Status: COMPLETED | OUTPATIENT
Start: 2021-03-22 | End: 2021-03-22

## 2021-03-22 RX ORDER — HYDROMORPHONE HYDROCHLORIDE 1 MG/ML
0.5 INJECTION, SOLUTION INTRAMUSCULAR; INTRAVENOUS; SUBCUTANEOUS
Status: DISCONTINUED | OUTPATIENT
Start: 2021-03-22 | End: 2021-03-23

## 2021-03-22 RX ORDER — MIDAZOLAM HYDROCHLORIDE 1 MG/ML
INJECTION INTRAMUSCULAR; INTRAVENOUS
Status: COMPLETED
Start: 2021-03-22 | End: 2021-03-22

## 2021-03-22 RX ORDER — HYDROMORPHONE HYDROCHLORIDE 1 MG/ML
1 INJECTION, SOLUTION INTRAMUSCULAR; INTRAVENOUS; SUBCUTANEOUS
Status: DISCONTINUED | OUTPATIENT
Start: 2021-03-22 | End: 2021-03-23

## 2021-03-22 RX ORDER — ALBUTEROL SULFATE 90 UG/1
2 AEROSOL, METERED RESPIRATORY (INHALATION) EVERY 6 HOURS PRN
Status: DISCONTINUED | OUTPATIENT
Start: 2021-03-22 | End: 2021-03-22 | Stop reason: SDUPTHER

## 2021-03-22 RX ORDER — OXYCODONE HYDROCHLORIDE 5 MG/1
20 TABLET ORAL EVERY 4 HOURS PRN
Status: DISCONTINUED | OUTPATIENT
Start: 2021-03-22 | End: 2021-03-23 | Stop reason: HOSPADM

## 2021-03-22 RX ORDER — MORPHINE SULFATE 4 MG/ML
4 INJECTION, SOLUTION INTRAMUSCULAR; INTRAVENOUS
Status: DISCONTINUED | OUTPATIENT
Start: 2021-03-22 | End: 2021-03-22 | Stop reason: HOSPADM

## 2021-03-22 RX ORDER — SODIUM CHLORIDE 0.9 % (FLUSH) 0.9 %
10 SYRINGE (ML) INJECTION PRN
Status: DISCONTINUED | OUTPATIENT
Start: 2021-03-22 | End: 2021-03-22 | Stop reason: HOSPADM

## 2021-03-22 RX ORDER — CELECOXIB 200 MG/1
200 CAPSULE ORAL ONCE
Status: COMPLETED | OUTPATIENT
Start: 2021-03-22 | End: 2021-03-22

## 2021-03-22 RX ORDER — ONDANSETRON 2 MG/ML
INJECTION INTRAMUSCULAR; INTRAVENOUS PRN
Status: DISCONTINUED | OUTPATIENT
Start: 2021-03-22 | End: 2021-03-22 | Stop reason: SDUPTHER

## 2021-03-22 RX ORDER — ROPIVACAINE HYDROCHLORIDE 5 MG/ML
INJECTION, SOLUTION EPIDURAL; INFILTRATION; PERINEURAL
Status: COMPLETED | OUTPATIENT
Start: 2021-03-22 | End: 2021-03-22

## 2021-03-22 RX ORDER — HYDROMORPHONE HYDROCHLORIDE 1 MG/ML
0.25 INJECTION, SOLUTION INTRAMUSCULAR; INTRAVENOUS; SUBCUTANEOUS EVERY 5 MIN PRN
Status: DISCONTINUED | OUTPATIENT
Start: 2021-03-22 | End: 2021-03-22 | Stop reason: HOSPADM

## 2021-03-22 RX ORDER — BUDESONIDE 0.5 MG/2ML
0.25 INHALANT ORAL 2 TIMES DAILY
Status: DISCONTINUED | OUTPATIENT
Start: 2021-03-23 | End: 2021-03-23 | Stop reason: HOSPADM

## 2021-03-22 RX ORDER — DIAZEPAM 5 MG/1
5 TABLET ORAL EVERY 6 HOURS PRN
Status: DISCONTINUED | OUTPATIENT
Start: 2021-03-22 | End: 2021-03-23 | Stop reason: HOSPADM

## 2021-03-22 RX ORDER — FUROSEMIDE 20 MG/1
20 TABLET ORAL DAILY
Status: DISCONTINUED | OUTPATIENT
Start: 2021-03-22 | End: 2021-03-23 | Stop reason: HOSPADM

## 2021-03-22 RX ORDER — HYDROMORPHONE HYDROCHLORIDE 1 MG/ML
0.5 INJECTION, SOLUTION INTRAMUSCULAR; INTRAVENOUS; SUBCUTANEOUS EVERY 5 MIN PRN
Status: DISCONTINUED | OUTPATIENT
Start: 2021-03-22 | End: 2021-03-22 | Stop reason: HOSPADM

## 2021-03-22 RX ORDER — HYDRALAZINE HYDROCHLORIDE 20 MG/ML
5 INJECTION INTRAMUSCULAR; INTRAVENOUS EVERY 10 MIN PRN
Status: DISCONTINUED | OUTPATIENT
Start: 2021-03-22 | End: 2021-03-22 | Stop reason: HOSPADM

## 2021-03-22 RX ORDER — OXYCODONE HCL 10 MG/1
10 TABLET, FILM COATED, EXTENDED RELEASE ORAL ONCE
Status: COMPLETED | OUTPATIENT
Start: 2021-03-22 | End: 2021-03-22

## 2021-03-22 RX ORDER — SODIUM CHLORIDE 9 MG/ML
INJECTION, SOLUTION INTRAVENOUS CONTINUOUS
Status: DISCONTINUED | OUTPATIENT
Start: 2021-03-22 | End: 2021-03-23 | Stop reason: HOSPADM

## 2021-03-22 RX ORDER — ONDANSETRON 4 MG/1
4 TABLET, ORALLY DISINTEGRATING ORAL EVERY 8 HOURS PRN
Status: DISCONTINUED | OUTPATIENT
Start: 2021-03-22 | End: 2021-03-23 | Stop reason: HOSPADM

## 2021-03-22 RX ORDER — DEXAMETHASONE SODIUM PHOSPHATE 10 MG/ML
INJECTION, SOLUTION INTRAMUSCULAR; INTRAVENOUS PRN
Status: DISCONTINUED | OUTPATIENT
Start: 2021-03-22 | End: 2021-03-22 | Stop reason: SDUPTHER

## 2021-03-22 RX ORDER — CARVEDILOL 3.12 MG/1
3.12 TABLET ORAL 2 TIMES DAILY WITH MEALS
Status: DISCONTINUED | OUTPATIENT
Start: 2021-03-22 | End: 2021-03-23 | Stop reason: HOSPADM

## 2021-03-22 RX ORDER — TRANEXAMIC ACID 100 MG/ML
INJECTION, SOLUTION INTRAVENOUS PRN
Status: DISCONTINUED | OUTPATIENT
Start: 2021-03-22 | End: 2021-03-22 | Stop reason: SDUPTHER

## 2021-03-22 RX ORDER — HYDROMORPHONE HYDROCHLORIDE 1 MG/ML
2 INJECTION, SOLUTION INTRAMUSCULAR; INTRAVENOUS; SUBCUTANEOUS
Status: DISCONTINUED | OUTPATIENT
Start: 2021-03-22 | End: 2021-03-23 | Stop reason: HOSPADM

## 2021-03-22 RX ORDER — PROPOFOL 10 MG/ML
INJECTION, EMULSION INTRAVENOUS CONTINUOUS PRN
Status: DISCONTINUED | OUTPATIENT
Start: 2021-03-22 | End: 2021-03-22 | Stop reason: SDUPTHER

## 2021-03-22 RX ORDER — LABETALOL HYDROCHLORIDE 5 MG/ML
5 INJECTION, SOLUTION INTRAVENOUS EVERY 10 MIN PRN
Status: DISCONTINUED | OUTPATIENT
Start: 2021-03-22 | End: 2021-03-22 | Stop reason: HOSPADM

## 2021-03-22 RX ORDER — SODIUM CHLORIDE 0.9 % (FLUSH) 0.9 %
10 SYRINGE (ML) INJECTION PRN
Status: DISCONTINUED | OUTPATIENT
Start: 2021-03-22 | End: 2021-03-23 | Stop reason: HOSPADM

## 2021-03-22 RX ORDER — DIPHENHYDRAMINE HCL 25 MG
25 TABLET ORAL EVERY 6 HOURS PRN
Status: DISCONTINUED | OUTPATIENT
Start: 2021-03-22 | End: 2021-03-23 | Stop reason: HOSPADM

## 2021-03-22 RX ORDER — ESOMEPRAZOLE MAGNESIUM 20 MG/1
20 FOR SUSPENSION ORAL DAILY
Status: DISCONTINUED | OUTPATIENT
Start: 2021-03-22 | End: 2021-03-22

## 2021-03-22 RX ORDER — METOCLOPRAMIDE HYDROCHLORIDE 5 MG/ML
10 INJECTION INTRAMUSCULAR; INTRAVENOUS
Status: DISCONTINUED | OUTPATIENT
Start: 2021-03-22 | End: 2021-03-22 | Stop reason: HOSPADM

## 2021-03-22 RX ORDER — ACETAMINOPHEN 500 MG
1000 TABLET ORAL ONCE
Status: COMPLETED | OUTPATIENT
Start: 2021-03-22 | End: 2021-03-22

## 2021-03-22 RX ORDER — FENTANYL CITRATE 50 UG/ML
50 INJECTION, SOLUTION INTRAMUSCULAR; INTRAVENOUS
Status: DISCONTINUED | OUTPATIENT
Start: 2021-03-22 | End: 2021-03-22 | Stop reason: HOSPADM

## 2021-03-22 RX ORDER — BUPIVACAINE HYDROCHLORIDE 7.5 MG/ML
INJECTION, SOLUTION INTRASPINAL PRN
Status: DISCONTINUED | OUTPATIENT
Start: 2021-03-22 | End: 2021-03-22 | Stop reason: SDUPTHER

## 2021-03-22 RX ORDER — EPHEDRINE SULFATE 50 MG/ML
INJECTION, SOLUTION INTRAVENOUS PRN
Status: DISCONTINUED | OUTPATIENT
Start: 2021-03-22 | End: 2021-03-22 | Stop reason: SDUPTHER

## 2021-03-22 RX ORDER — SODIUM CHLORIDE 0.9 % (FLUSH) 0.9 %
10 SYRINGE (ML) INJECTION EVERY 12 HOURS SCHEDULED
Status: DISCONTINUED | OUTPATIENT
Start: 2021-03-22 | End: 2021-03-23 | Stop reason: HOSPADM

## 2021-03-22 RX ORDER — OXYCODONE HCL 10 MG/1
10 TABLET, FILM COATED, EXTENDED RELEASE ORAL EVERY 12 HOURS SCHEDULED
Status: DISCONTINUED | OUTPATIENT
Start: 2021-03-22 | End: 2021-03-23 | Stop reason: HOSPADM

## 2021-03-22 RX ORDER — TAMSULOSIN HYDROCHLORIDE 0.4 MG/1
0.4 CAPSULE ORAL DAILY
Status: DISCONTINUED | OUTPATIENT
Start: 2021-03-22 | End: 2021-03-23 | Stop reason: HOSPADM

## 2021-03-22 RX ORDER — ALBUTEROL SULFATE 90 UG/1
2 AEROSOL, METERED RESPIRATORY (INHALATION) EVERY 6 HOURS PRN
Status: DISCONTINUED | OUTPATIENT
Start: 2021-03-22 | End: 2021-03-22

## 2021-03-22 RX ORDER — PROMETHAZINE HYDROCHLORIDE 25 MG/ML
6.25 INJECTION, SOLUTION INTRAMUSCULAR; INTRAVENOUS
Status: DISCONTINUED | OUTPATIENT
Start: 2021-03-22 | End: 2021-03-22 | Stop reason: HOSPADM

## 2021-03-22 RX ORDER — MEPERIDINE HYDROCHLORIDE 50 MG/ML
12.5 INJECTION INTRAMUSCULAR; INTRAVENOUS; SUBCUTANEOUS EVERY 5 MIN PRN
Status: DISCONTINUED | OUTPATIENT
Start: 2021-03-22 | End: 2021-03-22 | Stop reason: HOSPADM

## 2021-03-22 RX ORDER — ARFORMOTEROL TARTRATE 15 UG/2ML
15 SOLUTION RESPIRATORY (INHALATION) 2 TIMES DAILY
Status: DISCONTINUED | OUTPATIENT
Start: 2021-03-23 | End: 2021-03-23 | Stop reason: HOSPADM

## 2021-03-22 RX ORDER — LIDOCAINE HYDROCHLORIDE 10 MG/ML
INJECTION, SOLUTION INFILTRATION; PERINEURAL
Status: COMPLETED | OUTPATIENT
Start: 2021-03-22 | End: 2021-03-22

## 2021-03-22 RX ORDER — OXYCODONE HYDROCHLORIDE 5 MG/1
10 TABLET ORAL EVERY 4 HOURS PRN
Status: DISCONTINUED | OUTPATIENT
Start: 2021-03-22 | End: 2021-03-23

## 2021-03-22 RX ORDER — HYDROMORPHONE HYDROCHLORIDE 1 MG/ML
1 INJECTION, SOLUTION INTRAMUSCULAR; INTRAVENOUS; SUBCUTANEOUS
Status: DISCONTINUED | OUTPATIENT
Start: 2021-03-22 | End: 2021-03-23 | Stop reason: HOSPADM

## 2021-03-22 RX ORDER — ONDANSETRON 2 MG/ML
4 INJECTION INTRAMUSCULAR; INTRAVENOUS EVERY 6 HOURS PRN
Status: DISCONTINUED | OUTPATIENT
Start: 2021-03-22 | End: 2021-03-23 | Stop reason: HOSPADM

## 2021-03-22 RX ORDER — MIDAZOLAM HYDROCHLORIDE 1 MG/ML
2 INJECTION INTRAMUSCULAR; INTRAVENOUS
Status: ACTIVE | OUTPATIENT
Start: 2021-03-22 | End: 2021-03-22

## 2021-03-22 RX ORDER — SENNA AND DOCUSATE SODIUM 50; 8.6 MG/1; MG/1
1 TABLET, FILM COATED ORAL 2 TIMES DAILY
Status: DISCONTINUED | OUTPATIENT
Start: 2021-03-22 | End: 2021-03-23 | Stop reason: HOSPADM

## 2021-03-22 RX ORDER — CLINDAMYCIN PHOSPHATE 900 MG/50ML
900 INJECTION INTRAVENOUS EVERY 8 HOURS
Status: DISCONTINUED | OUTPATIENT
Start: 2021-03-22 | End: 2021-03-23 | Stop reason: HOSPADM

## 2021-03-22 RX ORDER — ACETAMINOPHEN 325 MG/1
650 TABLET ORAL EVERY 6 HOURS
Status: DISCONTINUED | OUTPATIENT
Start: 2021-03-22 | End: 2021-03-23 | Stop reason: HOSPADM

## 2021-03-22 RX ORDER — LIDOCAINE HYDROCHLORIDE 10 MG/ML
1 INJECTION, SOLUTION EPIDURAL; INFILTRATION; INTRACAUDAL; PERINEURAL
Status: DISCONTINUED | OUTPATIENT
Start: 2021-03-22 | End: 2021-03-22 | Stop reason: HOSPADM

## 2021-03-22 RX ORDER — DIPHENHYDRAMINE HYDROCHLORIDE 50 MG/ML
12.5 INJECTION INTRAMUSCULAR; INTRAVENOUS
Status: DISCONTINUED | OUTPATIENT
Start: 2021-03-22 | End: 2021-03-22 | Stop reason: HOSPADM

## 2021-03-22 RX ORDER — MORPHINE SULFATE 4 MG/ML
4 INJECTION, SOLUTION INTRAMUSCULAR; INTRAVENOUS EVERY 5 MIN PRN
Status: DISCONTINUED | OUTPATIENT
Start: 2021-03-22 | End: 2021-03-22 | Stop reason: HOSPADM

## 2021-03-22 RX ORDER — PANTOPRAZOLE SODIUM 40 MG/1
40 TABLET, DELAYED RELEASE ORAL
Status: DISCONTINUED | OUTPATIENT
Start: 2021-03-23 | End: 2021-03-23 | Stop reason: HOSPADM

## 2021-03-22 RX ORDER — PRAVASTATIN SODIUM 20 MG
40 TABLET ORAL DAILY
Status: DISCONTINUED | OUTPATIENT
Start: 2021-03-23 | End: 2021-03-23 | Stop reason: HOSPADM

## 2021-03-22 RX ORDER — OXYCODONE HYDROCHLORIDE 5 MG/1
5 TABLET ORAL EVERY 4 HOURS PRN
Status: DISCONTINUED | OUTPATIENT
Start: 2021-03-22 | End: 2021-03-23

## 2021-03-22 RX ORDER — SCOLOPAMINE TRANSDERMAL SYSTEM 1 MG/1
1 PATCH, EXTENDED RELEASE TRANSDERMAL ONCE
Status: DISCONTINUED | OUTPATIENT
Start: 2021-03-22 | End: 2021-03-22 | Stop reason: HOSPADM

## 2021-03-22 RX ADMIN — ACETAMINOPHEN 650 MG: 325 TABLET ORAL at 17:57

## 2021-03-22 RX ADMIN — OXYCODONE HYDROCHLORIDE 10 MG: 10 TABLET, FILM COATED, EXTENDED RELEASE ORAL at 20:22

## 2021-03-22 RX ADMIN — DEXAMETHASONE SODIUM PHOSPHATE 8 MG: 10 INJECTION, SOLUTION INTRAMUSCULAR; INTRAVENOUS at 13:55

## 2021-03-22 RX ADMIN — SODIUM CHLORIDE: 9 INJECTION, SOLUTION INTRAVENOUS at 17:58

## 2021-03-22 RX ADMIN — DIPHENHYDRAMINE HCL 25 MG: 25 TABLET ORAL at 20:22

## 2021-03-22 RX ADMIN — MIDAZOLAM HYDROCHLORIDE 2 MG: 1 INJECTION INTRAMUSCULAR; INTRAVENOUS at 13:07

## 2021-03-22 RX ADMIN — ONDANSETRON HYDROCHLORIDE 4 MG: 2 INJECTION, SOLUTION INTRAMUSCULAR; INTRAVENOUS at 13:35

## 2021-03-22 RX ADMIN — CARVEDILOL 3.12 MG: 3.12 TABLET, FILM COATED ORAL at 17:58

## 2021-03-22 RX ADMIN — ACETAMINOPHEN 1000 MG: 500 TABLET ORAL at 12:39

## 2021-03-22 RX ADMIN — LIDOCAINE HYDROCHLORIDE 1 ML: 10 INJECTION, SOLUTION INFILTRATION; PERINEURAL at 13:11

## 2021-03-22 RX ADMIN — TRAMADOL HYDROCHLORIDE 50 MG: 50 TABLET, FILM COATED ORAL at 17:57

## 2021-03-22 RX ADMIN — CLINDAMYCIN IN 5 PERCENT DEXTROSE 900 MG: 18 INJECTION, SOLUTION INTRAVENOUS at 17:58

## 2021-03-22 RX ADMIN — TRANEXAMIC ACID 1000 MG: 100 INJECTION, SOLUTION INTRAVENOUS at 13:51

## 2021-03-22 RX ADMIN — Medication 2000 MG: at 20:22

## 2021-03-22 RX ADMIN — DOCUSATE SODIUM 50 MG AND SENNOSIDES 8.6 MG 1 TABLET: 8.6; 5 TABLET, FILM COATED ORAL at 20:22

## 2021-03-22 RX ADMIN — EPHEDRINE SULFATE 5 MG: 50 INJECTION INTRAMUSCULAR; INTRAVENOUS; SUBCUTANEOUS at 15:27

## 2021-03-22 RX ADMIN — OXYCODONE HYDROCHLORIDE 10 MG: 10 TABLET, FILM COATED, EXTENDED RELEASE ORAL at 12:39

## 2021-03-22 RX ADMIN — TAMSULOSIN HYDROCHLORIDE 0.4 MG: 0.4 CAPSULE ORAL at 17:58

## 2021-03-22 RX ADMIN — MIDAZOLAM 2 MG: 1 INJECTION INTRAMUSCULAR; INTRAVENOUS at 13:07

## 2021-03-22 RX ADMIN — CELECOXIB 200 MG: 200 CAPSULE ORAL at 12:39

## 2021-03-22 RX ADMIN — ACETAMINOPHEN 650 MG: 325 TABLET ORAL at 22:48

## 2021-03-22 RX ADMIN — BUPIVACAINE HYDROCHLORIDE IN DEXTROSE 2 ML: 7.5 INJECTION, SOLUTION SUBARACHNOID at 13:44

## 2021-03-22 RX ADMIN — PREGABALIN 75 MG: 75 CAPSULE ORAL at 12:39

## 2021-03-22 RX ADMIN — ROPIVACAINE HYDROCHLORIDE 20 ML: 5 INJECTION, SOLUTION EPIDURAL; INFILTRATION; PERINEURAL at 13:11

## 2021-03-22 RX ADMIN — PROPOFOL 140 MCG/KG/MIN: 10 INJECTION, EMULSION INTRAVENOUS at 13:47

## 2021-03-22 RX ADMIN — Medication 2 G: at 13:52

## 2021-03-22 RX ADMIN — TRAMADOL HYDROCHLORIDE 50 MG: 50 TABLET, FILM COATED ORAL at 22:48

## 2021-03-22 RX ADMIN — RIVAROXABAN 10 MG: 10 TABLET, FILM COATED ORAL at 22:48

## 2021-03-22 RX ADMIN — SODIUM CHLORIDE, PRESERVATIVE FREE 10 ML: 5 INJECTION INTRAVENOUS at 20:22

## 2021-03-22 RX ADMIN — SODIUM CHLORIDE, SODIUM LACTATE, POTASSIUM CHLORIDE, AND CALCIUM CHLORIDE: 600; 310; 30; 20 INJECTION, SOLUTION INTRAVENOUS at 13:35

## 2021-03-22 RX ADMIN — EPHEDRINE SULFATE 5 MG: 50 INJECTION INTRAMUSCULAR; INTRAVENOUS; SUBCUTANEOUS at 15:08

## 2021-03-22 RX ADMIN — SODIUM CHLORIDE, SODIUM LACTATE, POTASSIUM CHLORIDE, AND CALCIUM CHLORIDE: 600; 310; 30; 20 INJECTION, SOLUTION INTRAVENOUS at 14:35

## 2021-03-22 RX ADMIN — FUROSEMIDE 20 MG: 20 TABLET ORAL at 17:58

## 2021-03-22 ASSESSMENT — LIFESTYLE VARIABLES: SMOKING_STATUS: 0

## 2021-03-22 ASSESSMENT — PAIN SCALES - GENERAL: PAINLEVEL_OUTOF10: 1

## 2021-03-22 NOTE — H&P
I have reviewed the history and physical for planned procedure. I have re-examined the patient and there are no changes to the history and physical unless noted below.     Electronically signed by Rina Paredes MD on 3/22/2021 at 12:09 PM

## 2021-03-22 NOTE — ANESTHESIA PROCEDURE NOTES
Spinal Block    Patient location during procedure: OR  Start time: 3/22/2021 1:44 PM  Reason for block: primary anesthetic  Staffing  Performed: resident/CRNA   Resident/CRNA: DECLAN Mg CRNA  Preanesthetic Checklist  Completed: patient identified, IV checked, site marked, risks and benefits discussed, surgical consent, monitors and equipment checked, pre-op evaluation, timeout performed, anesthesia consent given, oxygen available and patient being monitored  Spinal Block  Patient position: sitting  Prep: Betadine  Patient monitoring: cardiac monitor and continuous pulse ox  Approach: midline  Location: L3/L4  Provider prep: mask and sterile gloves  Local infiltration: lidocaine  Agent: bupivacaine  Dose: 2  Dose: 2  Needle  Needle type: Pencan   Needle gauge: 24 G  Needle length: 4 in  Lot number: [id number]  Expiration date: 4/1/2022  Assessment  Sensory level: T8  Events: cerebrospinal fluid  Swirl obtained: Yes  CSF: clear  Attempts: 1  Hemodynamics: stable  Additional Notes  Chart reviewed. Risks and benefits discussed with patient. Sterile prep and drape and sterility maintained throughout procedure. L3-L4 space identified. Lidocaine 1% skin wheal. Pencan 24 gauge needle used. Positive CSF noted. No paresthesia or Heme noted. Positive swirl at beginning of injection and midway. Appropriate sensory level achieved and confirmed by surgeon prior to incision being made.

## 2021-03-22 NOTE — BRIEF OP NOTE
Department of Orthopedic Surgery  Brief Operative Report      Surgeon: Dalton Gallardo    Procedure: Right TKA    Anesthesia:  Spinal Anesthesia and block    Estimated blood loss:  Less than 50 ml    Fluids:1200cc    TT: 49 minutes    UO: 250cc     Drians:  none      See dictated operative report for full details.     Electronically signed by Carlos Schaefer MD on 3/22/21 at 3:33 PM CDT

## 2021-03-22 NOTE — ANESTHESIA PRE PROCEDURE
Department of Anesthesiology  Preprocedure Note       Name:  Alexys Boyle   Age:  72 y.o.  :  1950                                          MRN:  [id number]         Date:  3/22/2021      Surgeon: Ryan Orellana):  Deb Castro MD    Procedure: Procedure(s):  RIGHT TOTAL KNEE REPLACEMENT    Medications prior to admission:   Prior to Admission medications    Medication Sig Start Date End Date Taking?  Authorizing Provider   aspirin 81 MG EC tablet Take 81 mg by mouth daily   Yes Historical Provider, MD   carvedilol (COREG) 3.125 MG tablet Take 3.125 mg by mouth 2 times daily (with meals)   Yes Historical Provider, MD   esomeprazole Magnesium (NEXIUM) 20 MG PACK Take 20 mg by mouth daily   Yes Historical Provider, MD   furosemide (LASIX) 20 MG tablet Take 20 mg by mouth daily   Yes Historical Provider, MD   pravastatin (PRAVACHOL) 40 MG tablet Take 40 mg by mouth daily   Yes Historical Provider, MD   albuterol sulfate HFA (VENTOLIN HFA) 108 (90 Base) MCG/ACT inhaler Inhale 2 puffs into the lungs every 6 hours as needed for Wheezing   Yes Historical Provider, MD   albuterol sulfate HFA (PROAIR HFA) 108 (90 Base) MCG/ACT inhaler Inhale 2 puffs into the lungs every 6 hours as needed for Wheezing   Yes Historical Provider, MD   fluticasone-umeclidin-vilant (TRELEGY ELLIPTA) 100-62.5-25 MCG/INH AEPB Inhale 1 puff into the lungs daily   Yes Historical Provider, MD       Current medications:    Current Facility-Administered Medications   Medication Dose Route Frequency Provider Last Rate Last Admin    ceFAZolin (ANCEF) 2000 mg in 0.9% sodium chloride 50 mL IVPB  2,000 mg Intravenous Once Deb Castro MD        dexamethasone (PF) (DECADRON) injection 10 mg  10 mg Intravenous Once Deb MD Matthew        midazolam (VERSED) injection 2 mg  2 mg Intravenous Once PRN Isaac Miranda MD           Allergies:  No Known Allergies    Problem List:    Patient Active Problem List   Diagnosis Code    Primary osteoarthritis of right knee M17.11       Past Medical History:        Diagnosis Date    Cancer ([address]     colon    COPD (chronic obstructive pulmonary disease) (HCC)     Hyperlipidemia     Hypertension     Irregular heart beat        Past Surgical History:        Procedure Laterality Date    BACK SURGERY      CARDIAC CATHETERIZATION         Social History:    Social History     Tobacco Use    Smoking status: Former Smoker     Quit date: 3/10/2008     Years since quittin.0    Smokeless tobacco: Never Used   Substance Use Topics    Alcohol use: Not on file                                Counseling given: Not Answered      Vital Signs (Current):   Vitals:    21 1227   BP: (!) 174/123   Pulse: 80   Resp: 14   Temp: 97.8 °F (36.6 °C)   TempSrc: Skin   SpO2: 98%   Weight: 240 lb (108.9 kg)   Height: 6' (1.829 m)                                              BP Readings from Last 3 Encounters:   21 (!) 174/123       NPO Status: Time of last liquid consumption: 0050                        Time of last solid consumption: 0500                        Date of last liquid consumption: 21                        Date of last solid food consumption: 21    BMI:   Wt Readings from Last 3 Encounters:   21 240 lb (108.9 kg)   03/10/21 240 lb (108.9 kg)     Body mass index is 32.55 kg/m².     CBC:   Lab Results   Component Value Date    WBC 8.0 03/10/2021    RBC 4.63 03/10/2021    HGB 14.3 03/10/2021    HCT 43.4 03/10/2021    MCV 93.7 03/10/2021    RDW 13.6 03/10/2021     03/10/2021       CMP:   Lab Results   Component Value Date     03/10/2021    K 4.4 03/10/2021     03/10/2021    CO2 29 03/10/2021    BUN 11 03/10/2021    CREATININE 0.9 03/10/2021    GFRAA >59 03/10/2021    LABGLOM >60 03/10/2021    GLUCOSE 70 03/10/2021    PROT 6.7 03/10/2021    CALCIUM 8.8 03/10/2021    BILITOT 0.5 03/10/2021    ALKPHOS 91 03/10/2021    AST 18 03/10/2021    ALT 19 03/10/2021       POC Tests: No results for input(s): POCGLU, POCNA, POCK, POCCL, POCBUN, POCHEMO, POCHCT in the last 72 hours. Coags:   Lab Results   Component Value Date    PROTIME 12.5 03/10/2021    INR 0.95 03/10/2021    APTT 27.4 03/10/2021       HCG (If Applicable): No results found for: PREGTESTUR, PREGSERUM, HCG, HCGQUANT     ABGs: No results found for: PHART, PO2ART, OHF6QCX, TPQ3JQG, BEART, X7NWYMLZ     Type & Screen (If Applicable):  No results found for: LABABO, LABRH    Drug/Infectious Status (If Applicable):  No results found for: HIV, HEPCAB    COVID-19 Screening (If Applicable):   Lab Results   Component Value Date    COVID19 NOT DETECTED 03/18/2021           Anesthesia Evaluation  Patient summary reviewed and Nursing notes reviewed no history of anesthetic complications:   Airway: Mallampati: II  TM distance: >3 FB   Neck ROM: full  Mouth opening: > = 3 FB Dental:    (+) upper dentures      Pulmonary:normal exam    (+) COPD:      (-) not a current smoker                           Cardiovascular:    (+) hypertension:, hyperlipidemia    (-) past MI    ECG reviewed               Beta Blocker:  Not on Beta Blocker         Neuro/Psych:      (-) seizures and CVA           GI/Hepatic/Renal:   (+) GERD:,           Endo/Other:        (-) diabetes mellitus               Abdominal:           Vascular: negative vascular ROS. Anesthesia Plan      regional and spinal     ASA 3     (Adductor canal block)      MIPS: Postoperative opioids intended and Prophylactic antiemetics administered. Anesthetic plan and risks discussed with patient.                       Parisa Laurent MD   3/22/2021

## 2021-03-22 NOTE — ANESTHESIA PROCEDURE NOTES
Peripheral Block    Patient location during procedure: holding area  Staffing  Performed: anesthesiologist   Anesthesiologist: Anisa Watson MD  Preanesthetic Checklist  Completed: patient identified, IV checked, site marked, risks and benefits discussed, surgical consent, monitors and equipment checked, pre-op evaluation, timeout performed, anesthesia consent given, oxygen available and patient being monitored  Peripheral Block  Patient position: supine  Prep: ChloraPrep  Patient monitoring: cardiac monitor, continuous pulse ox, frequent blood pressure checks and IV access  Block type: Femoral  Laterality: right  Injection technique: single-shot  Guidance: ultrasound guided  Local infiltration: ropivacaine  Infiltration strength: 0.5 %  Dose: 20 mL  Adductor canal  Provider prep: mask and sterile gloves  Local infiltration: ropivacaine  Needle  Needle type: combined needle/nerve stimulator   Needle gauge: 22 G  Needle length: 10 cm  Needle localization: ultrasound guidance  Assessment  Injection assessment: negative aspiration for heme, no paresthesia on injection and local visualized surrounding nerve on ultrasound  Paresthesia pain: none  Slow fractionated injection: yes  Hemodynamics: stable  Additional Notes  Under ultrasound (\"US\") guidance, a 22 gauge needle was inserted and placed in close proximity to the femoral nerve. Ultrasound was also used to visualize the spread of the anesthetic in close proximity to the nerve being blocked. The nerve appeared anatomically normal, and there were no abnormal pathological findings. A permanent image was recorded.      20 mL 0.5% Ropivacaine injected  Medications Administered  Lidocaine injection 1%, 1 mL  ropivacaine (NAROPIN) injection 0.5%, 20 mL  Reason for block: post-op pain management

## 2021-03-22 NOTE — ANESTHESIA POSTPROCEDURE EVALUATION
Department of Anesthesiology  Postprocedure Note    Patient: Alexys Boyle  MRN: [id number]  YOB: 1950  Date of evaluation: 3/22/2021  Time:  3:55 PM     Procedure Summary     Date: 03/22/21 Room / Location: [address]    Anesthesia Start: 4211 Anesthesia Stop: 9402    Procedure: RIGHT TOTAL KNEE REPLACEMENT (Right Knee) Diagnosis: (M17.11)    Surgeons: Deb Castro MD Responsible Provider: DECLAN Geller CRNA    Anesthesia Type: regional, spinal ASA Status: 3          Anesthesia Type: regional, spinal    Julio Phase I: Julio Score: 10    Julio Phase II:      Last vitals: Reviewed and per EMR flowsheets.        Anesthesia Post Evaluation    Patient location during evaluation: PACU  Patient participation: waiting for patient participation  Level of consciousness: sleepy but conscious  Pain score: 0  Airway patency: patent  Nausea & Vomiting: no nausea and no vomiting  Complications: no  Cardiovascular status: hemodynamically stable and blood pressure returned to baseline  Respiratory status: acceptable and room air  Hydration status: stable

## 2021-03-23 VITALS
RESPIRATION RATE: 16 BRPM | WEIGHT: 240 LBS | OXYGEN SATURATION: 95 % | DIASTOLIC BLOOD PRESSURE: 87 MMHG | HEIGHT: 72 IN | TEMPERATURE: 97.5 F | BODY MASS INDEX: 32.51 KG/M2 | HEART RATE: 77 BPM | SYSTOLIC BLOOD PRESSURE: 124 MMHG

## 2021-03-23 PROBLEM — M17.11 ARTHRITIS OF KNEE, RIGHT: Status: ACTIVE | Noted: 2021-03-23

## 2021-03-23 LAB
ANION GAP SERPL CALCULATED.3IONS-SCNC: 11 MMOL/L (ref 7–19)
BUN BLDV-MCNC: 14 MG/DL (ref 8–23)
CALCIUM SERPL-MCNC: 8.2 MG/DL (ref 8.8–10.2)
CHLORIDE BLD-SCNC: 102 MMOL/L (ref 98–111)
CO2: 24 MMOL/L (ref 22–29)
CREAT SERPL-MCNC: 1 MG/DL (ref 0.5–1.2)
GFR AFRICAN AMERICAN: >59
GFR NON-AFRICAN AMERICAN: >60
GLUCOSE BLD-MCNC: 156 MG/DL (ref 74–109)
HCT VFR BLD CALC: 36 % (ref 42–52)
HEMOGLOBIN: 12 G/DL (ref 14–18)
POTASSIUM REFLEX MAGNESIUM: 5 MMOL/L (ref 3.5–5)
SODIUM BLD-SCNC: 137 MMOL/L (ref 136–145)

## 2021-03-23 PROCEDURE — 85014 HEMATOCRIT: CPT

## 2021-03-23 PROCEDURE — G0378 HOSPITAL OBSERVATION PER HR: HCPCS

## 2021-03-23 PROCEDURE — 97116 GAIT TRAINING THERAPY: CPT

## 2021-03-23 PROCEDURE — 80048 BASIC METABOLIC PNL TOTAL CA: CPT

## 2021-03-23 PROCEDURE — 2500000003 HC RX 250 WO HCPCS: Performed by: ORTHOPAEDIC SURGERY

## 2021-03-23 PROCEDURE — 6370000000 HC RX 637 (ALT 250 FOR IP): Performed by: ORTHOPAEDIC SURGERY

## 2021-03-23 PROCEDURE — 97530 THERAPEUTIC ACTIVITIES: CPT

## 2021-03-23 PROCEDURE — 97165 OT EVAL LOW COMPLEX 30 MIN: CPT

## 2021-03-23 PROCEDURE — 2580000003 HC RX 258: Performed by: ORTHOPAEDIC SURGERY

## 2021-03-23 PROCEDURE — 97161 PT EVAL LOW COMPLEX 20 MIN: CPT

## 2021-03-23 PROCEDURE — 6360000002 HC RX W HCPCS: Performed by: ORTHOPAEDIC SURGERY

## 2021-03-23 PROCEDURE — 36415 COLL VENOUS BLD VENIPUNCTURE: CPT

## 2021-03-23 PROCEDURE — 97535 SELF CARE MNGMENT TRAINING: CPT

## 2021-03-23 PROCEDURE — 85018 HEMOGLOBIN: CPT

## 2021-03-23 RX ORDER — CLONIDINE HYDROCHLORIDE 0.1 MG/1
0.1 TABLET ORAL EVERY 4 HOURS PRN
Status: DISCONTINUED | OUTPATIENT
Start: 2021-03-23 | End: 2021-03-23 | Stop reason: HOSPADM

## 2021-03-23 RX ORDER — OXYCODONE HYDROCHLORIDE 5 MG/1
5 TABLET ORAL EVERY 4 HOURS PRN
Status: DISCONTINUED | OUTPATIENT
Start: 2021-03-23 | End: 2021-03-23 | Stop reason: HOSPADM

## 2021-03-23 RX ORDER — HYDROMORPHONE HYDROCHLORIDE 1 MG/ML
0.5 INJECTION, SOLUTION INTRAMUSCULAR; INTRAVENOUS; SUBCUTANEOUS
Status: DISCONTINUED | OUTPATIENT
Start: 2021-03-23 | End: 2021-03-23 | Stop reason: HOSPADM

## 2021-03-23 RX ORDER — OXYCODONE HYDROCHLORIDE 5 MG/1
5 TABLET ORAL EVERY 4 HOURS PRN
Qty: 60 TABLET | Refills: 0 | Status: SHIPPED | OUTPATIENT
Start: 2021-03-23 | End: 2021-04-22

## 2021-03-23 RX ORDER — POLYETHYLENE GLYCOL 3350 17 G/17G
17 POWDER, FOR SOLUTION ORAL DAILY
Status: DISCONTINUED | OUTPATIENT
Start: 2021-03-23 | End: 2021-03-23 | Stop reason: HOSPADM

## 2021-03-23 RX ADMIN — IPRATROPIUM BROMIDE 0.5 MG: 0.5 SOLUTION RESPIRATORY (INHALATION) at 07:59

## 2021-03-23 RX ADMIN — TAMSULOSIN HYDROCHLORIDE 0.4 MG: 0.4 CAPSULE ORAL at 09:42

## 2021-03-23 RX ADMIN — FUROSEMIDE 20 MG: 20 TABLET ORAL at 09:42

## 2021-03-23 RX ADMIN — PRAVASTATIN SODIUM 40 MG: 20 TABLET ORAL at 09:42

## 2021-03-23 RX ADMIN — POLYETHYLENE GLYCOL 3350 17 G: 17 POWDER, FOR SOLUTION ORAL at 09:42

## 2021-03-23 RX ADMIN — CARVEDILOL 3.12 MG: 3.12 TABLET, FILM COATED ORAL at 09:42

## 2021-03-23 RX ADMIN — ACETAMINOPHEN 650 MG: 325 TABLET ORAL at 05:29

## 2021-03-23 RX ADMIN — PANTOPRAZOLE SODIUM 40 MG: 40 TABLET, DELAYED RELEASE ORAL at 05:29

## 2021-03-23 RX ADMIN — BUDESONIDE 250 MCG: 0.5 SUSPENSION RESPIRATORY (INHALATION) at 08:00

## 2021-03-23 RX ADMIN — CLINDAMYCIN IN 5 PERCENT DEXTROSE 900 MG: 18 INJECTION, SOLUTION INTRAVENOUS at 01:33

## 2021-03-23 RX ADMIN — DOCUSATE SODIUM 50 MG AND SENNOSIDES 8.6 MG 1 TABLET: 8.6; 5 TABLET, FILM COATED ORAL at 09:42

## 2021-03-23 RX ADMIN — CLINDAMYCIN IN 5 PERCENT DEXTROSE 900 MG: 18 INJECTION, SOLUTION INTRAVENOUS at 09:39

## 2021-03-23 RX ADMIN — OXYCODONE HYDROCHLORIDE 10 MG: 10 TABLET, FILM COATED, EXTENDED RELEASE ORAL at 09:42

## 2021-03-23 RX ADMIN — ACETAMINOPHEN 650 MG: 325 TABLET ORAL at 11:41

## 2021-03-23 RX ADMIN — TRAMADOL HYDROCHLORIDE 50 MG: 50 TABLET, FILM COATED ORAL at 11:42

## 2021-03-23 RX ADMIN — DIAZEPAM 5 MG: 5 TABLET ORAL at 00:39

## 2021-03-23 RX ADMIN — Medication 2000 MG: at 05:29

## 2021-03-23 RX ADMIN — TRAMADOL HYDROCHLORIDE 50 MG: 50 TABLET, FILM COATED ORAL at 05:29

## 2021-03-23 RX ADMIN — ARFORMOTEROL TARTRATE 15 MCG: 15 SOLUTION RESPIRATORY (INHALATION) at 07:59

## 2021-03-23 RX ADMIN — SODIUM CHLORIDE: 9 INJECTION, SOLUTION INTRAVENOUS at 09:39

## 2021-03-23 ASSESSMENT — PAIN - FUNCTIONAL ASSESSMENT: PAIN_FUNCTIONAL_ASSESSMENT: PREVENTS OR INTERFERES WITH MANY ACTIVE NOT PASSIVE ACTIVITIES

## 2021-03-23 ASSESSMENT — PAIN SCALES - GENERAL
PAINLEVEL_OUTOF10: 2
PAINLEVEL_OUTOF10: 1
PAINLEVEL_OUTOF10: 5
PAINLEVEL_OUTOF10: 2
PAINLEVEL_OUTOF10: 1

## 2021-03-23 ASSESSMENT — PAIN DESCRIPTION - FREQUENCY: FREQUENCY: INTERMITTENT

## 2021-03-23 ASSESSMENT — PAIN DESCRIPTION - ORIENTATION: ORIENTATION: RIGHT

## 2021-03-23 ASSESSMENT — PAIN DESCRIPTION - PAIN TYPE: TYPE: ACUTE PAIN;SURGICAL PAIN

## 2021-03-23 NOTE — CARE COORDINATION
Met with patient to assess discharge needs. Patient lives at home with his spouse. He will be discharging with a walker and Home Health. He reports that he is able to afford his medications at this time. At discharge his spouse will provide transportation. [address] program.  Patient declined to participate in [employer] program.    [address] [phone] (home)   No relevant phone numbers on file. Is above address correct?  Yes    Electronically signed by Nicolle Renteria RN on 3/23/2021 at 9:42 AM

## 2021-03-23 NOTE — CARE COORDINATION
Cyn Del Valle, RN  P# [phone]    Patient would like dme item to be delivered to room #536. Patient is scheduled to discharge home today. Please call if you have any questions.   Patient Information    Patient Name   Alessia Murry ([id number]) Sex   Male    1950   Room Bed   0536 536-01   Patient Ethnicity & Race    Ethnic Group Patient Race   Non-/Non  [facility]   Patient Demographics    Address   [facility]. [address] [facility] Phone   [phone] (Home)   PCP and [facility], [address] [place] [address]   Emergency Contact(s)    Name Relation Home Work Mobile   Sharma Bence Spouse [phone]     Documents on File     Status Date Received Description   Documents for the Patient   Photo ID Not Received     Insurance Card Not Received     Financial Assistance Program Applications Not Received     Recurring Hospital Consent/HIPAA Scanned Not Received     Insurance Card Received 03/10/21    Photo ID Received 03/10/21    Insurance Card Received 03/10/21    Photo ID Received 03/10/21    Advance Directive Assessment Received 21    Documents for the [facility] Consent Treat/HIPAA Not Received     IMM Medicare Not Received     IMM - Medicare Second Copy Given to Pt      Observation Notification Not Received     Hospital Consent Treat/HIPAA Received 03/10/21    Hospital Consent Treat/HIPAA Received 21    H&P Received 21    Cost Receipt     Jump to Cost Receipt   Admission Information    Current Information    Attending Provider Admitting Provider Admission Type Admission Status   MD Jessica Bill MD Elective Admission (Confirmed)          Admission Date/Time Discharge Date Hospital Service Auth/Cert Status     11:58 AM  Med/Surg [address] [facility] [address] [facility] [address] Unit Room/Bed    24 [facility] 5 SURG SERVICES 0217/517-13 Admission    Complaint   M17.11   Hospital Account    Name Acct ID Class Status Primary Coverage   Ernestine Aiken [id number] Inpatient Open [employer] MEDICARE - [facility]          Guarantor Account (for Hospital Account [de-identified])    Name Relation to Pt Service Area Active?  Acct Type   Ernestine Aiken Self [employer] [facility] [employer] Yes Personal/Family   Address Phone     [address] [id number] [facility], [address] [place] [address] Road [phone])            Coverage Information (for Hospital Account [de-identified])    F/O Payor/Plan Precert #   [facility] #   Ernestine Aiken [id number]   Address Phone   [address] [facility] [place], [address] [place] [address]           Medical Problems  Comment     Last edited by  on  at    [facility] Problem List  Date Reviewed: 3/23/2021     ICD-10-CM Priority Class Noted POA    Primary osteoarthritis of right knee M17.11   3/22/2021 Yes   Arthritis of knee M17.10   3/22/2021 Yes      Non-Hospital Problem List  Date Reviewed: 3/23/2021  None        Electronically signed by Marco Frye RN on 3/23/2021 at 8:21 AM

## 2021-03-23 NOTE — DISCHARGE SUMMARY
Orthopedic [place] [facility]  Dr. Alberto Herrera  Discharge Summary     Angelita Pyle is a 72 y.o. male underwent right total knee replacement procedure without complication. Angelita Pyle was admitted to the floor following his   recovery in the PACU.      Discharge Diagnosis   Right Knee Replacement    Current Inpatient Medications    Current Facility-Administered Medications: oxyCODONE (ROXICODONE) immediate release tablet 5 mg, 5 mg, Oral, Q4H PRN **OR** [DISCONTINUED] oxyCODONE (ROXICODONE) immediate release tablet 10 mg, 10 mg, Oral, Q4H PRN  HYDROmorphone HCl PF (DILAUDID) injection 0.5 mg, 0.5 mg, Intravenous, Q3H PRN **OR** [DISCONTINUED] HYDROmorphone HCl PF (DILAUDID) injection 1 mg, 1 mg, Intravenous, Q3H PRN  polyethylene glycol (GLYCOLAX) packet 17 g, 17 g, Oral, Daily  carvedilol (COREG) tablet 3.125 mg, 3.125 mg, Oral, BID WC  furosemide (LASIX) tablet 20 mg, 20 mg, Oral, Daily  pravastatin (PRAVACHOL) tablet 40 mg, 40 mg, Oral, Daily  0.9 % sodium chloride infusion, , Intravenous, Continuous  0.9 % sodium chloride bolus, 500 mL, Intravenous, PRN  sodium chloride flush 0.9 % injection 10 mL, 10 mL, Intravenous, 2 times per day  sodium chloride flush 0.9 % injection 10 mL, 10 mL, Intravenous, PRN  acetaminophen (TYLENOL) tablet 650 mg, 650 mg, Oral, Q6H  sennosides-docusate sodium (SENOKOT-S) 8.6-50 MG tablet 1 tablet, 1 tablet, Oral, BID  magnesium hydroxide (MILK OF MAGNESIA) 400 MG/5ML suspension 30 mL, 30 mL, Oral, Daily PRN  ondansetron (ZOFRAN-ODT) disintegrating tablet 4 mg, 4 mg, Oral, Q8H PRN **OR** ondansetron (ZOFRAN) injection 4 mg, 4 mg, Intravenous, Q6H PRN  rivaroxaban (XARELTO) tablet 10 mg, 10 mg, Oral, Daily  oxyCODONE (OXYCONTIN) extended release tablet 10 mg, 10 mg, Oral, 2 times per day  oxyCODONE (ROXICODONE) immediate release tablet 10 mg, 10 mg, Oral, Q4H PRN **OR** oxyCODONE (ROXICODONE) immediate release tablet 20 mg, 20 mg, Oral, Q4H PRN  diazePAM (VALIUM) tablet 5 mg, 5 mg, Oral, Q6H PRN  traMADol (ULTRAM) tablet 50 mg, 50 mg, Oral, Q6H  HYDROmorphone HCl PF (DILAUDID) injection 1 mg, 1 mg, Intravenous, Q3H PRN **OR** HYDROmorphone HCl PF (DILAUDID) injection 2 mg, 2 mg, Intravenous, Q3H PRN  diphenhydrAMINE (BENADRYL) tablet 25 mg, 25 mg, Oral, Q6H PRN  clindamycin (CLEOCIN) 900 mg in dextrose 5 % 50 mL IVPB, 900 mg, Intravenous, Q8H  tamsulosin (FLOMAX) capsule 0.4 mg, 0.4 mg, Oral, Daily  lactated ringers infusion, , Intravenous, Continuous  pantoprazole (PROTONIX) tablet 40 mg, 40 mg, Oral, QAM AC  albuterol (PROVENTIL) nebulizer solution 2.5 mg, 2.5 mg, Nebulization, Q6H PRN  budesonide (PULMICORT) nebulizer suspension 250 mcg, 0.25 mg, Nebulization, BID **AND** ipratropium (ATROVENT) 0.02 % nebulizer solution 0.5 mg, 0.5 mg, Nebulization, 4x daily **AND** Arformoterol Tartrate (BROVANA) nebulizer solution 15 mcg, 15 mcg, Nebulization, BID    Post-operatively the patients diet was advanced as tolerated and their incision was checked on POD #1. The incision was clean, dry and intact with no signs of infection. The patient remained neurovascularly intact in the lower extremity and had intact pulses distally. Patients calf remained soft and showed no evidence of DVT. The patient was able to move his right leg and ankle/foot without any problems post-operatively. Physical therapy and occupational therapy were consulted and began working with the patient post-operatively. The patient progressed with PT/OT as would be expected and continued to improve through their stay. The patients pain was initially controlled with IV medications but we were able to transition to oral pain medications soon after arrival to the floor and their pain remained under good control through their hospital stay. From a medical standpoint the patient remained stable and continued to have the medicine team follow throughout their stay.  Acute postoperative blood loss anemia after joint replacement being monitored with daily hemoglobin/hematocrit. The patients dressing was changed/incison was checked on day of d/c. The patient will be discharged at this time to home with home health as per total knee protocol   with their current diet restrictions and will continue to follow the total knee precautions outlined to them by us and PT/OT. Condition on Discharge: Stable    Plan  Followup at scheduled appointment time (1 month post-op). Patient was instructed on the use of pain medications, the signs and symptoms of infection, and was given our number to call should they have any questions or concerns following discharge.

## 2021-03-23 NOTE — CONSULTS
Referring Provider: Dr. Prema Campoverde  Reason for Consultation: Medical management    Patient Care Team:  Jerry Edouard DO as PCP - General (Family Medicine)    Chief complaint right knee pain    Subjective . History of present illness: The patient presents to the orthopedic service for TKA. They have failed outpatient conservative treatment of NSAIDS, muscle relaxer, physical therapy and opioid pain meds. The pain is affecting their activities of daily living and they have chosen to undergo surgical correction. We have been asked to provide medical consultation by the attending physician since their primary care provider does not attend here at [address] [place] [address] in their healthcare during the perioperative phase was discussed with the patient. All questions were encouraged and answered to the best of our ability. The postoperative pain is as expected. There are no other participating or relieving factors noted. Pleasant 76-year-old gentleman resides in [place], [place] followed by Dr. Jeannine Marc for general medical care needs. Past medical history includes colorectal cancer, COPD, mixed hyperlipidemia, essential hypertension, irregular heartbeat and osteoarthritis. Postoperative pain is controlled. Ready and willing to work with therapy.     REVIEW OF SYSTEMS:    CONSTITUTIONAL:  Negative for anorexia, chills, fevers, night sweats and weight loss  EYES:  negative for eye dryness, icterus and redness  HEENT:   negative for dental problems, epistaxis, facial trauma and thrush  RESPIRATORY:  negative for chest tightness, cough, dyspnea on exertion, pneumonia and sputum  CARDIOVASCULAR: negative for chest pain, dyspnea, exertional chest pressure/discomfort, irregular heart beat, palpitations, paroxysmal nocturnal dyspnea and syncope  GASTROINTESTINAL:  negative for abdominal pain, hematemesis, jaundice, melena and rectal bleeding  MUSCULOSKELETAL:  negative for muscle weakness, myalgias and neck pain, chronic joint pain noted  NEUROLOGICAL:   negative for dizziness, headaches, seizures, speech problems, tremors and vertigo  INTEGUMENT: negative for pruritus, rash, skin color change and skin lesion(s)   A Full 14 point review of systems is negative outside those listed above and in the HPI      History    Past Medical History:   Diagnosis Date    Cancer ([facility]     colon    COPD (chronic obstructive pulmonary disease) ([facility]     Hyperlipidemia     Hypertension     Irregular heart beat      Past Surgical History:   Procedure Laterality Date    BACK SURGERY      CARDIAC CATHETERIZATION      JOINT REPLACEMENT      TOTAL KNEE ARTHROPLASTY Right 3/22/2021    RIGHT TOTAL KNEE REPLACEMENT performed by Addison De Los Santos MD at [address] [place] [address]     History reviewed. No pertinent family history. Social History     Tobacco Use    Smoking status: Former Smoker     Quit date: 3/10/2008     Years since quittin.0    Smokeless tobacco: Never Used   Substance Use Topics    Alcohol use: Not on file    Drug use: Not on file     Medications Prior to Admission: aspirin 81 MG EC tablet, Take 81 mg by mouth daily  carvedilol (COREG) 3.125 MG tablet, Take 3.125 mg by mouth 2 times daily (with meals)  esomeprazole Magnesium (NEXIUM) 20 MG PACK, Take 20 mg by mouth daily  furosemide (LASIX) 20 MG tablet, Take 20 mg by mouth daily  pravastatin (PRAVACHOL) 40 MG tablet, Take 40 mg by mouth daily  albuterol sulfate HFA (VENTOLIN HFA) 108 (90 Base) MCG/ACT inhaler, Inhale 2 puffs into the lungs every 6 hours as needed for Wheezing  albuterol sulfate HFA (PROAIR HFA) 108 (90 Base) MCG/ACT inhaler, Inhale 2 puffs into the lungs every 6 hours as needed for Wheezing  fluticasone-umeclidin-vilant (TRELEGY ELLIPTA) 100-62.5-25 MCG/INH AEPB, Inhale 1 puff into the lungs daily  Patient has no known allergies.   Objective     Vital Signs   All pre-op and post op vitals reviewed           Physical Exam:  Constitutional: oriented to clonidine ordered. Medically stable postop day #1  Encourage incentive spirometry every hour while awake  Wound care monitor for infection  Follow along with daily labs  Early mobilization, maximize efforts with therapy. Anticipate discharge home later today or tomorrow    I discussed the patients findings and my recommendations with patient/family, staff and the Orthopaedic team.  Keya Lorenzana  03/23/21  6:56 AM      Multiple medical issues listed above but seems stable. Plan discharge home either later today or early tomorrow morning. Close follow-up with Dr. Donita Sow upon discharge. The patient was seen on rounds today. Reviewed the last 24 hours of labs and x-rays that are available. Updated overnight status with nursing staff. Reviewed the case with Danny Louis PA-C. All questions were answered to the patient's/family's understanding. Patient is medically stable, please see orders for further details. We were asked to provide medical consultation by the attending physician during the perioperative phase. They will return to their primary care provider and have been instructed to follow up with them concerning abnormal lab/x-rays. Questions were encouraged and answered to the best of our ability. We will be available for 30 days or until they return to their primary care provider, if they return to the emergency room in the next 30 days with a katalina-operative issue we will gladly admit them. Otherwise, they will be admitted to the hospitalist service. All questions and concerns addressed prior to discharge. I have discussed the care of Jose Nuñez, including pertinent history and exam findings with the ARNP/PA. I have seen and examined the patient and the key elements of all parts of the encounter have been performed by me. I agree with the assessment and plan as outlined by the ARNP/PA. Please refer to my separate note for complete documentation.      Electronically signed by Samira Mcmillan MD on 3/23/2021 at 8:17 AM

## 2021-03-23 NOTE — PROGRESS NOTES
Physical Therapy  Name: María Murry  MRN:  [id number]  Date of service:  3/23/2021     03/23/21 1300   Restrictions/Precautions   Restrictions/Precautions Weight Bearing; Fall Risk   Lower Extremity Weight Bearing Restrictions   Right Lower Extremity Weight Bearing Weight Bearing As Tolerated   General   Chart Reviewed Yes   Subjective   Subjective Pt ready to walk with therapy and trial stairs before d/c home. Pain Screening   Patient Currently in Pain Denies   Transfers   Sit to Stand Contact guard assistance   Stand to sit Contact guard assistance   Ambulation   Ambulation? Yes   WB Status FWB   Ambulation 1   Surface level tile   Device Rolling Walker   Assistance Contact guard assistance   Quality of Gait STEP-TO PATTERN, SUFFICIENT WEIGHTBEARING   Gait Deviations Slow Carmelita;Decreased step length;Decreased step height   Distance 75'   Stairs/Curb   Stairs? Yes   Stairs   # Steps  5  (x2 sets)   Stairs Height 4\"  (6\")   Rails Bilateral   Device No Device   Assistance Contact guard assistance   Comment Vc's for correct sequencing   Short term goals   Time Frame for Short term goals 14 DAYS   Short term goal 1 BED MOB MOD IND   Short term goal 2 TRANSFERS SUPERVISION   Short term goal 3 ' RW SUPERVISION   Short term goal 4 UP/DOWN 3 STEPS CGA   Conditions Requiring Skilled Therapeutic Intervention   Body structures, Functions, Activity limitations Decreased functional mobility ; Decreased ADL status; Decreased ROM; Decreased strength;Decreased balance; Increased pain   Assessment Pt able to increase amb distance in hallway, completes stair training with vc's for correct technique and able to demonstrate good understanding. Pt returned to recliner with all needs in reach, wife present.    Activity Tolerance   Activity Tolerance Patient Tolerated treatment well   Safety Devices   Type of devices Call light within reach;Gait belt;Left in chair  (wife present)         Electronically signed by Jennifer Palacio PTA on 3/23/2021 at 2:21 PM

## 2021-03-23 NOTE — CARE COORDINATION
Spoke with patient regarding MD orders for [facility] services. Patient agreeable and has chosen [facility]. Referral Faxed. [address] [place] [address] [phone]. [phone]. Please notify [address] [place] [address] when patient discharges and fax DC Summary,  DC med list and any new [facility] orders. The Patient was provided with a choice of provider and agrees   with the discharge plan. [x] Yes [] No    Freedom of choice list was provided with basic dialogue that supports the patient's individualized plan of care/goals, treatment preferences and shares the quality data associated with the providers.  [x] Yes [] No  Electronically signed by Hang Garcia on 3/23/2021 at 9:28 AM

## 2021-03-23 NOTE — OP NOTE
[facility] [address], [facility]                                OPERATIVE REPORT    PATIENT NAME: Juan Luna                         :        1950  MED REC NO:   [id number]                              ROOM:       [facility]  ACCOUNT NO:   [de-identified]                           ADMIT DATE: 2021  PROVIDER:     Nicole Gold MD    DATE OF PROCEDURE:  2021    PREOPERATIVE DIAGNOSIS:  Primary osteoarthritis, right knee. POSTOPERATIVE DIAGNOSIS:  Primary osteoarthritis, right knee. PROCEDURE:  Right total knee arthroplasty. SURGEON:  Nicole Gold MD    FIRST ASSISTANT:  Macarena Vela PA-C. Please note, he is a critical  assistant in exposure and placement of implants. ANESTHESIA:  Spinal with adductor canal block. EBL:  50 mL. FLUIDS:  1200 mL of crystalloid. URINE OUTPUT:  250 mL. TOURNIQUET TIME:  49 minutes. COMPONENTS USED:  Magalys Persona knee system, femur size 11 narrow,  tibia size F, polyethylene size 10, patella size 41. INDICATIONS:  A 67-year-old gentleman with progressive arthritis of the  right knee, failing conservative care. Because of this, he elected for  the above procedure. OPERATIVE PROCEDURE:  After informed consent, he was given 2 gm of  Ancef, 1 gm of tranexamic acid, underwent adductor canal block and  spinal anesthetic. Tourniquet was placed around the right upper thigh. Left leg was placed in SCD. Scherer catheter was inserted. Right leg was  prepped and draped in the usual sterile fashion. The leg was Esmarched. Tourniquet was inflated to 200 mmHg. A midline incision was made. Parapatellar approach was made. Prepatellar fat pad was partially  excised. Synovium on the distal femur was elevated. Intramedullary  canal of the femur was drilled into. Distal resection made in 3 degrees  of valgus taking a +2 cut. Our medial resection was 10 mm, lateral  resection was 8 mm. We sized this to a size 11 femur. This was placed  in 5 degrees of external rotation. Anterior, posterior, chamfer cuts  were made. Posteromedial cut 12 mm. Posterolateral cut 6 mm. We then  exposed the tibia. Menisci were excised. The 7-degree cutting guide  was placed in line with the anterior tibial crest proximally and the  middle of the ankle distally and tibial resection was made. We had  equal flexion and extension gaps. We sized the tibia to a size F tibia,  which was placed in line with the anterior tibial crest.  Box cut and  lug holes were drilled. Trial reduction showed excellent flexion and  extension and a very well balanced and stable knee. The tibia was  finished with a Collet drill and keel punch. Any sclerotic bone drilled  with a 1/8th-inch drill bit. We mixed 20 mL of Exparel with 30 mL of  saline and 50 mL of 0.25% Marcaine. We injected the posteromedial  capsule with 40 mL and 60 mL in the subcutaneous tissues. Following  this, the patella measured 27 mm in thickness. The reaming system was  used. We sized this to a size 41 mm patella. Lug holes were drilled  and trial button fit very nicely. We then irrigated with 1500 mL of  normal saline and thoroughly dried the bone. Two batches of Palacos G  cement were mixed. The size F tibia was cemented followed by size 11  narrow femoral component. Trial liner was placed and size 41 mm patella  was cemented. Once the cement had hardened, tourniquet was released and  hemostasis was achieved. We irrigated with another 1500 mL of normal  saline. The final size 10 liner was locked in the tibial tray. Please  note, a lateral facet release was done on the patella prior to cementing  the patella. We had full extension, flexion easily to about 125 to 130  degrees with perfect midline tracking of the patella, very well balanced  and stable knee.   Parapatellar approach was closed with interrupted #1  Vicryl suture, 2-0 Vicryl suture for subcutaneous tissues, and 2-0  Vicryl for subcuticular stitch. Prineo Dermabond and soft dressings  were applied. The patient was taken to the recovery room in stable  condition. POSTOPERATIVE PLANS:  1. He will be on our typical total knee arthroplasty protocol. 2.  He will be on 2 doses of Ancef and 6 doses of clindamycin. 3.  He will be on Xarelto 10 mg a day for 21 days followed by  enteric-coated aspirin 81 mg twice a day for another 3 weeks after that. Please note, his preoperative range of motion to the right knee was 5 to  135 degrees. Please note, we are going to keep him one or two nights at  least as the patient does have COPD and did have some mild COVID  symptoms back in October.         Karlee Ruiz MD    D: 03/22/2021 16:31:37      T: 03/22/2021 17:21:46     [email or web address]  Job#: [id number]     Doc#: [id number]    CC:

## 2021-03-23 NOTE — PROGRESS NOTES
Physical Therapy    Facility/Department: [facility] SURG SERVICES  Initial Assessment    NAME: Sofia Salcedo  : 1950  MRN: [id number]    Date of Service: 3/23/2021    Discharge Recommendations:  Continue to assess pending progress, Home with Home health PT, 24 hour supervision or assist        Assessment   Body structures, Functions, Activity limitations: Decreased functional mobility ; Decreased ADL status; Decreased ROM; Decreased strength;Decreased balance; Increased pain  Assessment: Pt ABLE TO AMB IN ROOM WITH RW THEN UP TO RECLINER. WILL PROGRESS WITH HOUSEHOLD GT DISTANCES. WILL HAVE SPOUSE AT HOME TO ASSIST AS NEEDED. PLANS FOR D/C LATER TODAY VS TOMORROW AM.  PT Education: PT Role;Plan of Care  REQUIRES PT FOLLOW UP: Yes  Activity Tolerance  Activity Tolerance: Patient Tolerated treatment well       Patient Diagnosis(es): The encounter diagnosis was Primary osteoarthritis of right knee. has a past medical history of Cancer ([facility] [address] COPD (chronic obstructive pulmonary disease) ([facility] [address] Hyperlipidemia, Hypertension, and Irregular heart beat.   has a past surgical history that includes back surgery; Cardiac catheterization; joint replacement; and Total knee arthroplasty (Right, 3/22/2021). Restrictions  Restrictions/Precautions  Restrictions/Precautions: Weight Bearing, Fall Risk  Lower Extremity Weight Bearing Restrictions  Right Lower Extremity Weight Bearing: Weight Bearing As Tolerated  Vision/Hearing  Vision: Within Functional Limits  Hearing: Within functional limits     Subjective  General  Diagnosis: R TKR  Subjective  Subjective: Pt READY TO SIT IN CHAIR  Pain Assessment  Pain Assessment: 0-10  Pain Level: 5  Pain Type: Acute pain;Surgical pain  Pain Location: Knee  Pain Orientation: Right  Pain Descriptors: Aching; Sore  Pain Frequency: Intermittent  Functional Pain Assessment: Prevents or interferes with many active not passive activities  Non-Pharmaceutical Pain Intervention(s): Ambulation/Increased Activity;Cold applied;Repositioned       Orientation  Orientation  Overall Orientation Status: Within Normal Limits  Social/Functional History  Social/Functional History  Lives With: Spouse  Type of Home: House  Home Layout: One level  Home Access: Stairs to enter with rails  Entrance Stairs - Number of Steps: 3  Bathroom Shower/Tub: Walk-in shower  Bathroom Toilet: Handicap height  ADL Assistance: Independent  Homemaking Assistance: Independent  Homemaking Responsibilities: Yes  Ambulation Assistance: Independent  Transfer Assistance: Independent  Active : Yes  Cognition   Cognition  Overall Cognitive Status: WNL    Objective          AROM RLE (degrees)  RLE General AROM: SEATED KNEE FLEX ~ 85  Strength RLE  Comment: GROSSLY +3/5        Bed mobility  Supine to Sit: Stand by assistance  Transfers  Sit to Stand: Contact guard assistance;Minimal Assistance  Stand to sit: Contact guard assistance;Minimal Assistance  Bed to Chair: Contact guard assistance  Stand Pivot Transfers: Contact guard assistance  Ambulation  WB Status: FWB  Ambulation 1  Device: Rolling Walker  Assistance: Contact guard assistance  Quality of Gait: STEP-TO PATTERN, SUFFICIENT WEIGHTBEARING  Gait Deviations: Slow Carmelita;Decreased step length;Decreased step height  Distance: 12'     Balance  Sitting - Dynamic: Good  Standing - Dynamic: Fair;+        Plan   Plan  Times per week: AT LEAST 7  Current Treatment Recommendations: Strengthening, ROM, Balance Training, Functional Mobility Training, Transfer Training, Gait Training, Stair training, Patient/Caregiver Education & Training, Safety Education & Training  Safety Devices  Type of devices: Call light within reach    G-Code       OutComes Score                                                  AM-PAC Score             Goals  Short term goals  Time Frame for Short term goals: 14 DAYS  Short term goal 1: BED MOB MOD IND  Short term goal 2: TRANSFERS SUPERVISION  Short term goal 3: ' RW SUPERVISION  Short term goal 4: UP/DOWN 3 STEPS CGA       Therapy Time   Individual Concurrent Group Co-treatment   Time In           Time Out           Minutes                   Tisha Peacock, PT

## 2021-03-23 NOTE — PROGRESS NOTES
Patient discharged home today with [facility]. Medications and discharge instructions reviewed with patient. A handout of all new medications was given to the patient for reference with all possible side effects highlighted. Patient verbalized understanding. Patient stable upon discharge.   Electronically signed by Marissa Marley RN on 3/23/2021 at 12:03 PM

## 2021-03-23 NOTE — PROGRESS NOTES
CLINICAL PHARMACY NOTE: MEDS TO [address] [facility] [facility] Patient?: No  Total # of Prescriptions Filled: 2   The following medications were delivered to the patient:  · Oxycodone 5mg  · xarelto 10mg  Total # of Interventions Completed: 0  Time Spent (min): 30    Additional Documentation:  Patient paid cash for his medications and Louie Cooley took the bag to go over his medications and the schedule of each one.   Patient and his wife was in the room

## 2021-03-23 NOTE — PROGRESS NOTES
Subjective:     Post-Operative Day: 1 Status Post right TKA. Doing well this am.  He is awake and alert. Ox3. No new issues. Systemic or Specific Complaints: No Complaints  no nausea and no vomiting Pain 3    Objective:     Patient Vitals for the past 24 hrs:   BP Temp Temp src Pulse Resp SpO2 Height Weight   03/23/21 0711 120/82 98.5 °F (36.9 °C) -- 79 16 92 % -- --   03/23/21 0228 107/70 98.5 °F (36.9 °C) Temporal 77 16 90 % -- --   03/22/21 2220 119/74 97.8 °F (36.6 °C) Temporal 81 16 99 % -- --   03/22/21 1840 (!) 129/90 95.9 °F (35.5 °C) Temporal 67 16 98 % -- --   03/22/21 1715 (!) 153/95 95.9 °F (35.5 °C) Temporal 50 16 99 % -- --   03/22/21 1627 -- -- -- 62 -- -- -- --   03/22/21 1623 119/77 -- -- 65 15 93 % -- --   03/22/21 1618 123/76 -- -- 67 23 95 % -- --   03/22/21 1615 116/73 -- -- 64 13 94 % -- --   03/22/21 1613 -- -- -- 68 18 95 % -- --   03/22/21 1608 111/71 -- -- 71 21 94 % -- --   03/22/21 1603 -- -- -- 73 20 93 % -- --   03/22/21 1600 -- -- -- 73 -- -- -- --   03/22/21 1558 104/84 -- -- 78 16 97 % -- --   03/22/21 1553 (!) 92/59 97.3 °F (36.3 °C) Temporal 69 12 94 % -- --   03/22/21 1227 (!) 174/123 97.8 °F (36.6 °C) Skin 80 14 98 % 6' (1.829 m) 240 lb (108.9 kg)       General: alert, appears stated age, cooperative, no distress and mildly obese   Exam: Good active motion to right lower extremity   Wound: Wound clean and dry no evidence of infection. , No Drainage and Wound Intact   Neurovascular: Exam normal     DVT Exam: No evidence of DVT seen on physical exam.   Xray:  right Knee implants in good position       Data Review:  Recent Labs     03/23/21 0255   HGB 12.0*     Recent Labs     03/23/21 0255      K 5.0   CREATININE 1.0     Recent Labs     03/23/21 0255   LABGLOM >60     No results for input(s): INR in the last 72 hours. Assessment:     Status Post right TKA. Doing well postoperatively. Plan:     Pt is doing well this am.  He will have therapy today.   If he passes PT then okay to discharge to home with home health as per total knee protocol. He may f/u in office in 6 weeks.       Electronically signed by Orly Madsen PA-C on 3/23/2021 at 7:30 AM

## 2021-03-23 NOTE — PROGRESS NOTES
Occupational Therapy   Occupational Therapy Initial Assessment  Date: 3/23/2021   Patient Name: Noris Mcneill  MRN: [id number]     : 1950    Date of Service: 3/23/2021    Discharge Recommendations:  Home with Home health OT       Assessment   Performance deficits / Impairments: Decreased functional mobility   Assessment: Pt requires extra time to ambulate with RW. Pt is independent with standing and transfers. Pt understood and is independent with LE dressing techniques. Treatment Diagnosis: Muscle weakness M62.81  Prognosis: Good  Decision Making: Low Complexity  OT Education: OT Role;Plan of Care;ADL Adaptive Strategies  Patient Education: Educated patient regarding ADLS in the home. REQUIRES OT FOLLOW UP: No           Patient Diagnosis(es): The encounter diagnosis was Primary osteoarthritis of right knee. has a past medical history of Cancer ([facility] [address] COPD (chronic obstructive pulmonary disease) ([facility] [address] Hyperlipidemia, Hypertension, and Irregular heart beat.   has a past surgical history that includes back surgery; Cardiac catheterization; joint replacement; and Total knee arthroplasty (Right, 3/22/2021). Treatment Diagnosis: Muscle weakness M62.81      Restrictions  Restrictions/Precautions  Restrictions/Precautions: Weight Bearing, Fall Risk  Lower Extremity Weight Bearing Restrictions  Right Lower Extremity Weight Bearing: Weight Bearing As Tolerated    Subjective   General  Chart Reviewed: Yes  Patient assessed for rehabilitation services?: Yes  Additional Pertinent Hx: Pt has COPD and is retired. Pt had a R TKA and is to discharge home. Pt lives with his wife in a ranch style home. Response to previous treatment: Patient with no complaints from previous session  Family / Caregiver Present: No  Referring Practitioner: Dimple Valdes MD  Diagnosis: Arthritis of knee  Subjective  Subjective: \"I think I am doing pretty good. \" \"I can walk around with the Mackenzie Flirt in the room. \"  General Comment  Comments: Pt is retired from construction work. Patient Currently in Pain: Denies  Vital Signs  Patient Currently in Pain: Denies  Social/Functional History  Social/Functional History  Lives With: Spouse  Type of Home: House  Home Layout: One level  Home Access: Stairs to enter with rails  Entrance Stairs - Number of Steps: 3  Bathroom Shower/Tub: Walk-in shower  Bathroom Toilet: Handicap height  ADL Assistance: Independent  Homemaking Assistance: Independent  Homemaking Responsibilities: Yes  Ambulation Assistance: Independent  Transfer Assistance: Independent  Active : Yes  Type of occupation: retired       Objective     Observation/Palpation  Observation: Pt stood from chair Independently. ADL  Feeding: Independent  Grooming: Independent  UE Bathing: Independent  LE Bathing: Independent  UE Dressing: Independent  LE Dressing: Independent  Toileting: Modified independent       Cognition  Overall Cognitive Status: WNL    LUE AROM (degrees)  LUE AROM : WFL  Right Hand PROM (degrees)  Right Hand PROM: WFL  LUE Strength  Gross LUE Strength: WFL  L Hand General: 4+/5  RUE Strength  Gross RUE Strength: WFL  R Hand General: 4+/5        Plan   Plan  Times per week: 1  Current Treatment Recommendations: Self-Care / ADL, Strengthening  Plan Comment: Pt to discharge home today with orders for home health. Goals  Short term goals  Time Frame for Short term goals: 1-2 days  Short term goal 1: Pt to be independent with donning his shorts following OT education regarding dressing LE. Goal Met. Short term goal 2: Pt to be Independent with standing and pivot transfers. Goal Met  Long term goals  Time Frame for Long term goals : 2-5 days  Long term goal 1: Pt to be able to complete ADLS independently. Goal met. Patient Goals   Patient goals : To go home today and be able to walk without a walker soon.        Therapy Time   Individual Concurrent Group Co-treatment   Time In 0900         Time Out 0930         Minutes 30 Jose Antonio Gomez, OT Electronically signed by FARNAZ Lancaster,MOT, OTR/L on 3/23/2021 at 2:33 PM

## 2021-03-24 ENCOUNTER — TELEPHONE (OUTPATIENT)
Dept: INPATIENT UNIT | Age: 71
End: 2021-03-24

## 2021-03-24 NOTE — TELEPHONE ENCOUNTER
Follow up phone call x 1 attempt. No one answered.   Electronically signed by Audelia Boyer RN on 3/24/2021 at 11:06 AM

## 2021-11-01 ENCOUNTER — OFFICE VISIT (OUTPATIENT)
Dept: NEUROSURGERY | Facility: CLINIC | Age: 71
End: 2021-11-01

## 2021-11-01 VITALS
SYSTOLIC BLOOD PRESSURE: 140 MMHG | WEIGHT: 236 LBS | BODY MASS INDEX: 31.97 KG/M2 | HEIGHT: 72 IN | DIASTOLIC BLOOD PRESSURE: 88 MMHG

## 2021-11-01 DIAGNOSIS — Z87.891 FORMER SMOKER: ICD-10-CM

## 2021-11-01 DIAGNOSIS — R20.2 NUMBNESS AND TINGLING OF BOTH FEET: Primary | ICD-10-CM

## 2021-11-01 DIAGNOSIS — E66.09 CLASS 1 OBESITY DUE TO EXCESS CALORIES WITH BODY MASS INDEX (BMI) OF 32.0 TO 32.9 IN ADULT, UNSPECIFIED WHETHER SERIOUS COMORBIDITY PRESENT: ICD-10-CM

## 2021-11-01 DIAGNOSIS — R20.0 NUMBNESS AND TINGLING OF BOTH FEET: Primary | ICD-10-CM

## 2021-11-01 PROBLEM — E66.811 CLASS 1 OBESITY DUE TO EXCESS CALORIES WITH BODY MASS INDEX (BMI) OF 32.0 TO 32.9 IN ADULT: Status: ACTIVE | Noted: 2021-11-01

## 2021-11-01 PROCEDURE — 99203 OFFICE O/P NEW LOW 30 MIN: CPT | Performed by: NURSE PRACTITIONER

## 2021-11-01 RX ORDER — GABAPENTIN 300 MG/1
300 CAPSULE ORAL 3 TIMES DAILY
COMMUNITY
End: 2021-11-22 | Stop reason: SDUPTHER

## 2021-11-01 RX ORDER — ESZOPICLONE 2 MG/1
2 TABLET, FILM COATED ORAL NIGHTLY
COMMUNITY

## 2021-11-01 NOTE — PROGRESS NOTES
"    Chief complaint:   Chief Complaint   Patient presents with   • Numbness of bilateral feet     Mohit has been referred here today [place] [facility] [place] \"numbness of both feet\", he states he does not have back pain or leg pain.  He also has had EMG/NCV of BLE, results are in the chart.        Subjective     HPI: This is a 71-year-old male gentleman who was referred to us by Dr. Vipin Polo for back pain and numbness and tingling in his feet bilaterally.  The patient says that he did have surgery back in 1985 for back pain and right lower extremity pain by Dr. Meza.  He did well from that surgery and to continue to work.  Currently he is saying that he does have some intermittent pain in his back that is worse with lifting and prolonged sitting but is better with walking.  His main complaint however is numbness and tingling in his feet bilaterally.  This is constant.  Nothing in particular makes it worse or better.  Is not complaining of any radicular leg pain.  Denies any bowel or bladder incontinence.  He was started on gabapentin twice a day and says that he may have noticed about 25% improvement with the medication.  He is not done any recent physical therapy or chiropractic care or pain management injections.  He is right-hand dominant.  He has been retired since 2013.  Denies any tobacco, alcohol, or illicit drug use.    Review of Systems   Respiratory: Positive for shortness of breath.    Neurological: Positive for numbness.   All other systems reviewed and are negative.       Past Medical History:   Diagnosis Date   • Arrhythmia    • Arthritis    • Cancer (HCC)     colon   • COPD (chronic obstructive pulmonary disease) (HCC)    • Hyperlipidemia    • Hypertension    • Numbness of foot     Bilateral      Past Surgical History:   Procedure Laterality Date   • BACK SURGERY     • CARDIAC CATHETERIZATION N/A 12/10/2020    Procedure: Coronary angiography;  Surgeon: Lonny Wesley MD;  " "Location:  [facility] CATH INVASIVE LOCATION;  Service: Cardiology;  Laterality: N/A;   • COLON RESECTION     • REPLACEMENT TOTAL KNEE Right      Family History   Problem Relation Age of Onset   • Lung disease Mother    • Heart disease Father    • No Known Problems Sister    • No Known Problems Brother    • No Known Problems Sister    • No Known Problems Brother      Social History     Tobacco Use   • Smoking status: Former Smoker     Types: Cigarettes     Quit date:      Years since quittin.8   • Smokeless tobacco: Former User     Quit date:    Substance Use Topics   • Alcohol use: Not Currently     Comment: quit    • Drug use: Never     (Not in a hospital admission)    Allergies:  Patient has no known allergies.    Objective      Vital Signs  /88   Ht 182.9 cm (72\")   Wt 107 kg (236 lb)   BMI 32.01 kg/m²     Physical Exam  Constitutional:       Appearance: Normal appearance. He is well-developed.   HENT:      Head: Normocephalic.   Eyes:      General: Lids are normal.      Extraocular Movements: EOM normal.      Conjunctiva/sclera: Conjunctivae normal.      Pupils: Pupils are equal, round, and reactive to light.   Cardiovascular:      Rate and Rhythm: Normal rate and regular rhythm.   Pulmonary:      Effort: Pulmonary effort is normal.      Breath sounds: Normal breath sounds.   Musculoskeletal:         General: Normal range of motion.      Cervical back: Normal range of motion.   Skin:     General: Skin is warm.   Neurological:      Mental Status: He is alert and oriented to person, place, and time.      GCS: GCS eye subscore is 4. GCS verbal subscore is 5. GCS motor subscore is 6.      Cranial Nerves: No cranial nerve deficit.      Sensory: No sensory deficit.      Gait: Gait is intact.      Deep Tendon Reflexes: Strength normal and reflexes are normal and symmetric. Reflexes normal.   Psychiatric:         Speech: Speech normal.         Behavior: Behavior normal.         Thought Content: " Thought content normal.         Neurologic Exam     Mental Status   Oriented to person, place, and time.   Attention: normal. Concentration: normal.   Speech: speech is normal   Level of consciousness: alert  Normal comprehension.     Cranial Nerves     CN II   Visual fields full to confrontation.     CN III, IV, VI   Pupils are equal, round, and reactive to light.  Extraocular motions are normal.     CN V   Facial sensation intact.     CN VII   Facial expression full, symmetric.     CN VIII   CN VIII normal.     CN IX, X   CN IX normal.   CN X normal.     CN XI   CN XI normal.     CN XII   CN XII normal.     Motor Exam   Muscle bulk: normal    Strength   Strength 5/5 throughout.     Sensory Exam   Light touch normal.     Gait, Coordination, and Reflexes     Gait  Gait: normal    Reflexes   Reflexes 2+ except as noted.       Imaging review: EMG/NCS that was done at [facility] shows patient does have concern for lumbar radiculopathy.  There is no evidence of any peripheral neuropathy.        MRI of the lumbar spine that was done at [facility] on October 15, 2021 shows the patient does have left-sided foraminal narrowing at L5-S1 along with disc degeneration.  At L4-5 bilateral foraminal narrowing.  At L3-4 mild bilateral foraminal narrowing.  At L2-3 facet arthropathy with mild bilateral foraminal narrowing.  No fracture visualized.  No cord signal change.    Assessment/Plan: I am going to have the patient increase his gabapentin to 3 times a day to see if this will help with numbness and tingling he is experiencing  For first line conservative care of lumbar pain, I would like to send Mr. Holm for a dedicated course of physician directed physical therapy consisting of 2-3 times a week for 4-6 weeks.    Return for reassessment with me after 6-8 weeks after physical therapy.   Should he not make any improvement from the therapy medication changes we can consider injections in  the lumbar spine  I advised the patient to call and return sooner for new or worsening complaints of weakness, paresthesias, gait disturbances, or any additional concerns.  Treatment options discussed in detail with Mohit and the patient voiced understanding.  Mr. Holm agrees with this plan of care.     Patient is a nonsmoker  The patient's Body mass index is 32.01 kg/m².. BMI is above normal parameters. Recommendations include: educational material and nutrition counseling  Advance Care Planning   ACP discussion was held with the patient during this visit. Patient does not have an advance directive, information provided.      Diagnoses and all orders for this visit:    1. Numbness and tingling of both feet (Primary)  -     Ambulatory Referral to Physical Therapy Evaluate and treat (2-3 days a week for 4-6 weeks )    2. Class 1 obesity due to excess calories with body mass index (BMI) of 32.0 to 32.9 in adult, unspecified whether serious comorbidity present    3. Former smoker          I discussed the patients findings and my recommendations with patient    Michael Masters, PEGGY  11/01/21  11:42 CDT

## 2021-11-01 NOTE — PATIENT INSTRUCTIONS
"BMI for Adults  What is BMI?  Body mass index (BMI) is a number that is calculated from a person's weight and height. BMI can help estimate how much of a person's weight is composed of fat. BMI does not measure body fat directly. Rather, it is an alternative to procedures that directly measure body fat, which can be difficult and expensive.  BMI can help identify people who may be at higher risk for certain medical problems.  What are BMI measurements used for?  BMI is used as a screening tool to identify possible weight problems. It helps determine whether a person is obese, overweight, a healthy weight, or underweight.  BMI is useful for:  · Identifying a weight problem that may be related to a medical condition or may increase the risk for medical problems.  · Promoting changes, such as changes in diet and exercise, to help reach a healthy weight. BMI screening can be repeated to see if these changes are working.  How is BMI calculated?  BMI involves measuring your weight in relation to your height. Both height and weight are measured, and the BMI is calculated from those numbers. This can be done either in English ([place] or metric measurements. Note that charts and online BMI calculators are available to help you find your BMI quickly and easily without having to do these calculations yourself.  To calculate your BMI in English ([place] measurements:    1. Measure your weight in pounds (lb).  2. Multiply the number of pounds by 703.  ? For example, for a person who weighs 180 lb, multiply that number by 703, which equals 126,540.  3. Measure your height in inches. Then multiply that number by itself to get a measurement called \"inches squared.\"  ? For example, for a person who is 70 inches tall, the \"inches squared\" measurement is 70 inches x 70 inches, which equals 4,900 inches squared.  4. Divide the total from step 2 (number of lb x 703) by the total from step 3 (inches squared): 126,540 ÷ 4,900 = 25.8. This is " "your BMI.    To calculate your BMI in metric measurements:  1. Measure your weight in kilograms (kg).  2. Measure your height in meters (m). Then multiply that number by itself to get a measurement called \"meters squared.\"  ? For example, for a person who is 1.75 m tall, the \"meters squared\" measurement is 1.75 m x 1.75 m, which is equal to 3.1 meters squared.  3. Divide the number of kilograms (your weight) by the meters squared number. In this example: 70 ÷ 3.1 = 22.6. This is your BMI.  What do the results mean?  BMI charts are used to identify whether you are underweight, normal weight, overweight, or obese. The following guidelines will be used:  · Underweight: BMI less than 18.5.  · Normal weight: BMI between 18.5 and 24.9.  · Overweight: BMI between 25 and 29.9.  · Obese: BMI of 30 or above.  Keep these notes in mind:  · Weight includes both fat and muscle, so someone with a muscular build, such as an athlete, may have a BMI that is higher than 24.9. In cases like these, BMI is not an accurate measure of body fat.  · To determine if excess body fat is the cause of a BMI of 25 or higher, further assessments may need to be done by a health care provider.  · BMI is usually interpreted in the same way for men and women.  Where to find more information  For more information about BMI, including tools to quickly calculate your BMI, go to these websites:  · Centers for Disease Control and Prevention: www.cdc.gov  · American Heart Association: www.heart.org  · National Heart, Lung, and Blood [place]: www.nhlbi.nih.gov  Summary  · Body mass index (BMI) is a number that is calculated from a person's weight and height.  · BMI may help estimate how much of a person's weight is composed of fat. BMI can help identify those who may be at higher risk for certain medical problems.  · BMI can be measured using English measurements or metric measurements.  · BMI charts are used to identify whether you are underweight, normal " "weight, overweight, or obese.  This information is not intended to replace advice given to you by your health care provider. Make sure you discuss any questions you have with your health care provider.  Document Revised: 09/09/2020 Document Reviewed: 07/17/2020  [employer] Patient Education © 2021 [employer]      https://www.nhlbi.nih.gov/files/docs/public/heart/dash_brief.pdf\">   DASH Eating Plan  DASH stands for Dietary Approaches to Stop Hypertension. The DASH eating plan is a healthy eating plan that has been shown to:  · Reduce high blood pressure (hypertension).  · Reduce your risk for type 2 diabetes, heart disease, and stroke.  · Help with weight loss.  What are tips for following this plan?  Reading food labels  · Check food labels for the amount of salt (sodium) per serving. Choose foods with less than 5 percent of the Daily Value of sodium. Generally, foods with less than 300 milligrams (mg) of sodium per serving fit into this eating plan.  · To find whole grains, look for the word \"whole\" as the first word in the ingredient list.  Shopping  · Buy products labeled as \"low-sodium\" or \"no salt added.\"  · Buy fresh foods. Avoid canned foods and pre-made or frozen meals.  Cooking  · Avoid adding salt when cooking. Use salt-free seasonings or herbs instead of table salt or sea salt. Check with your health care provider or pharmacist before using salt substitutes.  · Do not cagle foods. Cook foods using healthy methods such as baking, boiling, grilling, roasting, and broiling instead.  · Cook with heart-healthy oils, such as olive, canola, avocado, soybean, or sunflower oil.  Meal planning    · Eat a balanced diet that includes:  ? 4 or more servings of fruits and 4 or more servings of vegetables each day. Try to fill one-half of your plate with fruits and vegetables.  ? 6-8 servings of whole grains each day.  ? Less than 6 oz (170 g) of lean meat, poultry, or fish each day. A 3-oz (85-g) serving of meat is " about the same size as a deck of cards. One egg equals 1 oz (28 g).  ? 2-3 servings of low-fat dairy each day. One serving is 1 cup (237 mL).  ? 1 serving of nuts, seeds, or beans 5 times each week.  ? 2-3 servings of heart-healthy fats. Healthy fats called omega-3 fatty acids are found in foods such as walnuts, flaxseeds, fortified milks, and eggs. These fats are also found in cold-water fish, such as sardines, salmon, and mackerel.  · Limit how much you eat of:  ? Canned or prepackaged foods.  ? Food that is high in trans fat, such as some fried foods.  ? Food that is high in saturated fat, such as fatty meat.  ? Desserts and other sweets, sugary drinks, and other foods with added sugar.  ? Full-fat dairy products.  · Do not salt foods before eating.  · Do not eat more than 4 egg yolks a week.  · Try to eat at least 2 vegetarian meals a week.  · Eat more home-cooked food and less restaurant, buffet, and fast food.    Lifestyle  · When eating at a restaurant, ask that your food be prepared with less salt or no salt, if possible.  · If you drink alcohol:  ? Limit how much you use to:  § 0-1 drink a day for women who are not pregnant.  § 0-2 drinks a day for men.  ? Be aware of how much alcohol is in your drink. In the [place] one drink equals one 12 oz bottle of beer (355 mL), one 5 oz glass of wine (148 mL), or one 1½ oz glass of hard liquor (44 mL).  General information  · Avoid eating more than 2,300 mg of salt a day. If you have hypertension, you may need to reduce your sodium intake to 1,500 mg a day.  · Work with your health care provider to maintain a healthy body weight or to lose weight. Ask what an ideal weight is for you.  · Get at least 30 minutes of exercise that causes your heart to beat faster (aerobic exercise) most days of the week. Activities may include walking, swimming, or biking.  · Work with your health care provider or dietitian to adjust your eating plan to your individual calorie needs.  What  foods should I eat?  Fruits  All fresh, dried, or frozen fruit. Canned fruit in natural juice (without added sugar).  Vegetables  Fresh or frozen vegetables (raw, steamed, roasted, or grilled). Low-sodium or reduced-sodium tomato and vegetable juice. Low-sodium or reduced-sodium tomato sauce and tomato paste. Low-sodium or reduced-sodium canned vegetables.  Grains  Whole-grain or whole-wheat bread. Whole-grain or whole-wheat pasta. Brown rice. Oatmeal. Quinoa. Bulgur. Whole-grain and low-sodium cereals. Mica bread. Low-fat, low-sodium crackers. Whole-wheat flour tortillas.  Meats and other proteins  Skinless chicken or turkey. Ground chicken or turkey. Pork with fat trimmed off. Fish and seafood. Egg whites. Dried beans, peas, or lentils. Unsalted nuts, nut butters, and seeds. Unsalted canned beans. Lean cuts of beef with fat trimmed off. Low-sodium, lean precooked or cured meat, such as sausages or meat loaves.  Dairy  Low-fat (1%) or fat-free (skim) milk. Reduced-fat, low-fat, or fat-free cheeses. Nonfat, low-sodium ricotta or cottage cheese. Low-fat or nonfat yogurt. Low-fat, low-sodium cheese.  Fats and oils  Soft margarine without trans fats. Vegetable oil. Reduced-fat, low-fat, or light mayonnaise and salad dressings (reduced-sodium). Canola, safflower, olive, avocado, soybean, and sunflower oils. Avocado.  Seasonings and condiments  Herbs. Spices. Seasoning mixes without salt.  Other foods  Unsalted popcorn and pretzels. Fat-free sweets.  The items listed above may not be a complete list of foods and beverages you can eat. Contact a dietitian for more information.  What foods should I avoid?  Fruits  Canned fruit in a light or heavy syrup. Fried fruit. Fruit in cream or butter sauce.  Vegetables  Creamed or fried vegetables. Vegetables in a cheese sauce. Regular canned vegetables (not low-sodium or reduced-sodium). Regular canned tomato sauce and paste (not low-sodium or reduced-sodium). Regular tomato and  vegetable juice (not low-sodium or reduced-sodium). Pickles. Olives.  Grains  Baked goods made with fat, such as croissants, muffins, or some breads. Dry pasta or rice meal packs.  Meats and other proteins  Fatty cuts of meat. Ribs. Fried meat. Shea. Bologna, salami, and other precooked or cured meats, such as sausages or meat loaves. Fat from the back of a pig (fatback). Bratwurst. Salted nuts and seeds. Canned beans with added salt. Canned or smoked fish. Whole eggs or egg yolks. Chicken or turkey with skin.  Dairy  Whole or 2% milk, cream, and half-and-half. Whole or full-fat cream cheese. Whole-fat or sweetened yogurt. Full-fat cheese. Nondairy creamers. Whipped toppings. Processed cheese and cheese spreads.  Fats and oils  Butter. Stick margarine. Lard. Shortening. Ghee. Shea fat. Tropical oils, such as coconut, palm kernel, or palm oil.  Seasonings and condiments  Onion salt, garlic salt, seasoned salt, table salt, and sea salt. Worcestershire sauce. Tartar sauce. Barbecue sauce. Teriyaki sauce. Soy sauce, including reduced-sodium. Steak sauce. Canned and packaged gravies. Fish sauce. Oyster sauce. Cocktail sauce. Store-bought horseradish. Ketchup. Mustard. Meat flavorings and tenderizers. Bouillon cubes. Hot sauces. Pre-made or packaged marinades. Pre-made or packaged taco seasonings. Relishes. Regular salad dressings.  Other foods  Salted popcorn and pretzels.  The items listed above may not be a complete list of foods and beverages you should avoid. Contact a dietitian for more information.  Where to find more information  · National Heart, Lung, and Blood [place]: www.nhlbi.nih.gov  · American Heart Association: www.heart.org  · Academy of Nutrition and Dietetics: www.eatright.org  · National Kidney Foundation: www.kidney.org  Summary  · The DASH eating plan is a healthy eating plan that has been shown to reduce high blood pressure (hypertension). It may also reduce your risk for type 2 diabetes, heart  disease, and stroke.  · When on the DASH eating plan, aim to eat more fresh fruits and vegetables, whole grains, lean proteins, low-fat dairy, and heart-healthy fats.  · With the DASH eating plan, you should limit salt (sodium) intake to 2,300 mg a day. If you have hypertension, you may need to reduce your sodium intake to 1,500 mg a day.  · Work with your health care provider or dietitian to adjust your eating plan to your individual calorie needs.  This information is not intended to replace advice given to you by your health care provider. Make sure you discuss any questions you have with your health care provider.  Document Revised: 11/20/2020 Document Reviewed: 11/20/2020  [employer] Patient Education © 2021 [employer]      Advance Care Planning and Advance Directives     You make decisions on a daily basis - decisions about where you want to live, your career, your home, your life. Perhaps one of the most important decisions you face is your choice for future medical care. Take time to talk with your family and your healthcare team and start planning today.  Advance Care Planning is a process that can help you:  · Understand possible future healthcare decisions in light of your own experiences  · Reflect on those decision in light of your goals and values  · Discuss your decisions with those closest to you and the healthcare professionals that care for you  · Make a plan by creating a document that reflects your wishes    Surrogate Decision Maker  In the event of a medical emergency, which has left you unable to communicate or to make your own decisions, you would need someone to make decisions for you.  It is important to discuss your preferences for medical treatment with this person while you are in good health.     Qualities of a surrogate decision maker:  • Willing to take on this role and responsibility  • Knows what you want for future medical care  • Willing to follow your wishes even if they don't  agree with them  • Able to make difficult medical decisions under stressful circumstances    Advance Directives  These are legal documents you can create that will guide your healthcare team and decision maker(s) when needed. These documents can be stored in the electronic medical record.    · Living Will - a legal document to guide your care if you have a terminal condition or a serious illness and are unable to communicate. States vary by statute in document names/types, but most forms may include one or more of the following:        -  Directions regarding life-prolonging treatments        -  Directions regarding artificially provided nutrition/hydration        -  Choosing a healthcare decision maker        -  Direction regarding organ/tissue donation    · Durable Power of  for Healthcare - this document names an -in-fact to make medical decisions for you, but it may also allow this person to make personal and financial decisions for you. Please seek the advice of an  if you need this type of document.    **Advance Directives are not required and no one may discriminate against you if you do not sign one.    Medical Orders  Many states allow specific forms/orders signed by your physician to record your wishes for medical treatment in your current state of health. This form, signed in personal communication with your physician, addresses resuscitation and other medical interventions that you may or may not want.      For more information or to schedule a time with a [facility] Advance Care Planning Facilitator contact: [email or web address] or call [phone] and someone will contact you directly.

## 2021-11-22 DIAGNOSIS — R20.0 NUMBNESS AND TINGLING OF BOTH FEET: Primary | ICD-10-CM

## 2021-11-22 DIAGNOSIS — R20.2 NUMBNESS AND TINGLING OF BOTH FEET: Primary | ICD-10-CM

## 2021-11-22 RX ORDER — GABAPENTIN 300 MG/1
300 CAPSULE ORAL 3 TIMES DAILY
Qty: 90 CAPSULE | Refills: 1 | Status: SHIPPED | OUTPATIENT
Start: 2021-11-22 | End: 2022-01-31

## 2021-11-22 NOTE — TELEPHONE ENCOUNTER
Patient was told by Michael on 11/1/21 to increase the Gabapentin from BID to TID.  He did this and it is helping but he ran out of and needs a refill.   I will send the refill authorization to Michael and told the patient to check with his pharmacy later this afternoon.      mai keller CMA

## 2021-12-23 ENCOUNTER — OFFICE VISIT (OUTPATIENT)
Dept: NEUROSURGERY | Facility: CLINIC | Age: 71
End: 2021-12-23

## 2021-12-23 VITALS
HEIGHT: 72 IN | SYSTOLIC BLOOD PRESSURE: 134 MMHG | BODY MASS INDEX: 31.86 KG/M2 | WEIGHT: 235.2 LBS | DIASTOLIC BLOOD PRESSURE: 98 MMHG

## 2021-12-23 DIAGNOSIS — Z87.891 FORMER SMOKER: ICD-10-CM

## 2021-12-23 DIAGNOSIS — M79.671 FOOT PAIN, BILATERAL: ICD-10-CM

## 2021-12-23 DIAGNOSIS — M79.672 FOOT PAIN, BILATERAL: ICD-10-CM

## 2021-12-23 DIAGNOSIS — E66.09 CLASS 1 OBESITY DUE TO EXCESS CALORIES WITHOUT SERIOUS COMORBIDITY WITH BODY MASS INDEX (BMI) OF 31.0 TO 31.9 IN ADULT: ICD-10-CM

## 2021-12-23 DIAGNOSIS — R20.0 NUMBNESS AND TINGLING OF BOTH FEET: Primary | ICD-10-CM

## 2021-12-23 DIAGNOSIS — R20.2 NUMBNESS AND TINGLING OF BOTH FEET: Primary | ICD-10-CM

## 2021-12-23 PROCEDURE — 99213 OFFICE O/P EST LOW 20 MIN: CPT | Performed by: NURSE PRACTITIONER

## 2021-12-23 NOTE — PROGRESS NOTES
"    Chief complaint:   Chief Complaint   Patient presents with   • Numbness and tingling     Mohit is returning after physical therapy, he states the therapy has not made in change in his symptoms.         Subjective     HPI: This is a 71-year-old male gentleman who was referred to us by Dr. Vipin Polo for back pain and numbness and tingling in his feet bilaterally.  The patient says that he did have surgery back in 1985 for back pain and right lower extremity pain by Dr. Meza.  He did well from that surgery and to continue to work.  Currently he is saying that he does have some intermittent pain in his back that is worse with lifting and prolonged sitting but is better with walking.  His main complaint however is numbness and tingling in his feet bilaterally.  This is constant.  Nothing in particular makes it worse or better.  Is not complaining of any radicular leg pain.  Denies any bowel or bladder incontinence.  He was started on gabapentin twice a day and says that he may have noticed about 25% improvement with the medication.  He is not done any recent chiropractic care or pain management injections.  He is right-hand dominant.  He has been retired since 2013.  Denies any tobacco, alcohol, or illicit drug use.    The patient did go through a dedicated course of physical therapy and is here in follow-up today.  He says he did not make any significant improvement in his pain issues.  He does continue to have intermittent back pain and some complaints of radiating pain but the pain continues to be consistent and constant primarily in his feet on the bottom side of his feet.  He says this is a burning pain along with numbness tingling.  He said he does think the gabapentin may have helped to a degree    Review of Systems   Musculoskeletal: Positive for back pain.   Neurological: Positive for numbness.   Psychiatric/Behavioral: Negative.          Objective      Vital Signs  /98   Ht 182.9 cm (72\")   " Wt 107 kg (235 lb 3.2 oz)   BMI 31.90 kg/m²     Physical Exam  Constitutional:       Appearance: He is well-developed.   HENT:      Head: Normocephalic.   Eyes:      Extraocular Movements: EOM normal.      Pupils: Pupils are equal, round, and reactive to light.   Cardiovascular:      Pulses:           Dorsalis pedis pulses are 1+ on the right side and 1+ on the left side.        Posterior tibial pulses are 1+ on the right side and 1+ on the left side.   Pulmonary:      Effort: Pulmonary effort is normal.   Musculoskeletal:         General: Normal range of motion.      Cervical back: Normal range of motion.   Skin:     General: Skin is warm.   Neurological:      Mental Status: He is alert and oriented to person, place, and time.      GCS: GCS eye subscore is 4. GCS verbal subscore is 5. GCS motor subscore is 6.      Cranial Nerves: No cranial nerve deficit.      Sensory: No sensory deficit.      Gait: Gait is intact. Gait normal.      Deep Tendon Reflexes: Strength normal and reflexes are normal and symmetric.   Psychiatric:         Speech: Speech normal.         Behavior: Behavior normal.         Thought Content: Thought content normal.         Neurologic Exam     Mental Status   Oriented to person, place, and time.   Attention: normal. Concentration: normal.   Speech: speech is normal   Level of consciousness: alert  Normal comprehension.     Cranial Nerves     CN II   Visual fields full to confrontation.     CN III, IV, VI   Pupils are equal, round, and reactive to light.  Extraocular motions are normal.     CN V   Facial sensation intact.     CN VII   Facial expression full, symmetric.     CN VIII   CN VIII normal.     CN IX, X   CN IX normal.   CN X normal.     CN XI   CN XI normal.     CN XII   CN XII normal.     Motor Exam   Muscle bulk: normal    Strength   Strength 5/5 throughout.     Sensory Exam   Light touch normal.     Gait, Coordination, and Reflexes     Gait  Gait: normal    Reflexes   Reflexes 2+  except as noted.       Imaging review: No new imaging        Assessment/Plan: I am going to have the patient go for set of injections in his lumbar spine given the fact that the nerve conduction study was positive for lumbar radiculopathy.  I will send him for a JOCELYN bilaterally to see if there is any vascular issues that may be causing his pain as well.  Should his vascular study come back negative I think he would also benefit from evaluation from podiatry to make sure there is nothing else that may be playing a factor in his foot pain.  He was told to call if any further problems or concerns.  We will have him follow-up with Dr. Perry at the next available appointment    Patient is a nonsmoker  The patient's Body mass index is 31.9 kg/m².. BMI is above normal parameters. Recommendations include: educational material and nutrition counseling  Advance Care Planning   ACP discussion was held with the patient during this visit. Patient does not have an advance directive, information provided.     Diagnoses and all orders for this visit:    1. Numbness and tingling of both feet (Primary)  -     US Ankle / Brachial Indices Extremity Complete; Future    2. Foot pain, bilateral  -     US Ankle / Brachial Indices Extremity Complete; Future    3. Class 1 obesity due to excess calories without serious comorbidity with body mass index (BMI) of 31.0 to 31.9 in adult    4. Former smoker        I discussed the patients findings and my recommendations with patient  Michael Masters, APRN  12/23/21  10:27 CST

## 2021-12-23 NOTE — PATIENT INSTRUCTIONS
"BMI for Adults  What is BMI?  Body mass index (BMI) is a number that is calculated from a person's weight and height. BMI can help estimate how much of a person's weight is composed of fat. BMI does not measure body fat directly. Rather, it is an alternative to procedures that directly measure body fat, which can be difficult and expensive.  BMI can help identify people who may be at higher risk for certain medical problems.  What are BMI measurements used for?  BMI is used as a screening tool to identify possible weight problems. It helps determine whether a person is obese, overweight, a healthy weight, or underweight.  BMI is useful for:  · Identifying a weight problem that may be related to a medical condition or may increase the risk for medical problems.  · Promoting changes, such as changes in diet and exercise, to help reach a healthy weight. BMI screening can be repeated to see if these changes are working.  How is BMI calculated?  BMI involves measuring your weight in relation to your height. Both height and weight are measured, and the BMI is calculated from those numbers. This can be done either in English ([place] or metric measurements. Note that charts and online BMI calculators are available to help you find your BMI quickly and easily without having to do these calculations yourself.  To calculate your BMI in English ([place] measurements:    1. Measure your weight in pounds (lb).  2. Multiply the number of pounds by 703.  ? For example, for a person who weighs 180 lb, multiply that number by 703, which equals 126,540.  3. Measure your height in inches. Then multiply that number by itself to get a measurement called \"inches squared.\"  ? For example, for a person who is 70 inches tall, the \"inches squared\" measurement is 70 inches x 70 inches, which equals 4,900 inches squared.  4. Divide the total from step 2 (number of lb x 703) by the total from step 3 (inches squared): 126,540 ÷ 4,900 = 25.8. This is " "your BMI.    To calculate your BMI in metric measurements:  1. Measure your weight in kilograms (kg).  2. Measure your height in meters (m). Then multiply that number by itself to get a measurement called \"meters squared.\"  ? For example, for a person who is 1.75 m tall, the \"meters squared\" measurement is 1.75 m x 1.75 m, which is equal to 3.1 meters squared.  3. Divide the number of kilograms (your weight) by the meters squared number. In this example: 70 ÷ 3.1 = 22.6. This is your BMI.  What do the results mean?  BMI charts are used to identify whether you are underweight, normal weight, overweight, or obese. The following guidelines will be used:  · Underweight: BMI less than 18.5.  · Normal weight: BMI between 18.5 and 24.9.  · Overweight: BMI between 25 and 29.9.  · Obese: BMI of 30 or above.  Keep these notes in mind:  · Weight includes both fat and muscle, so someone with a muscular build, such as an athlete, may have a BMI that is higher than 24.9. In cases like these, BMI is not an accurate measure of body fat.  · To determine if excess body fat is the cause of a BMI of 25 or higher, further assessments may need to be done by a health care provider.  · BMI is usually interpreted in the same way for men and women.  Where to find more information  For more information about BMI, including tools to quickly calculate your BMI, go to these websites:  · Centers for Disease Control and Prevention: www.cdc.gov  · American Heart Association: www.heart.org  · National Heart, Lung, and Blood [place]: www.nhlbi.nih.gov  Summary  · Body mass index (BMI) is a number that is calculated from a person's weight and height.  · BMI may help estimate how much of a person's weight is composed of fat. BMI can help identify those who may be at higher risk for certain medical problems.  · BMI can be measured using English measurements or metric measurements.  · BMI charts are used to identify whether you are underweight, normal " "weight, overweight, or obese.  This information is not intended to replace advice given to you by your health care provider. Make sure you discuss any questions you have with your health care provider.  Document Revised: 09/09/2020 Document Reviewed: 07/17/2020  [employer] Patient Education © 2021 [employer]      https://www.nhlbi.nih.gov/files/docs/public/heart/dash_brief.pdf\">   DASH Eating Plan  DASH stands for Dietary Approaches to Stop Hypertension. The DASH eating plan is a healthy eating plan that has been shown to:  · Reduce high blood pressure (hypertension).  · Reduce your risk for type 2 diabetes, heart disease, and stroke.  · Help with weight loss.  What are tips for following this plan?  Reading food labels  · Check food labels for the amount of salt (sodium) per serving. Choose foods with less than 5 percent of the Daily Value of sodium. Generally, foods with less than 300 milligrams (mg) of sodium per serving fit into this eating plan.  · To find whole grains, look for the word \"whole\" as the first word in the ingredient list.  Shopping  · Buy products labeled as \"low-sodium\" or \"no salt added.\"  · Buy fresh foods. Avoid canned foods and pre-made or frozen meals.  Cooking  · Avoid adding salt when cooking. Use salt-free seasonings or herbs instead of table salt or sea salt. Check with your health care provider or pharmacist before using salt substitutes.  · Do not cagle foods. Cook foods using healthy methods such as baking, boiling, grilling, roasting, and broiling instead.  · Cook with heart-healthy oils, such as olive, canola, avocado, soybean, or sunflower oil.  Meal planning    · Eat a balanced diet that includes:  ? 4 or more servings of fruits and 4 or more servings of vegetables each day. Try to fill one-half of your plate with fruits and vegetables.  ? 6-8 servings of whole grains each day.  ? Less than 6 oz (170 g) of lean meat, poultry, or fish each day. A 3-oz (85-g) serving of meat is " about the same size as a deck of cards. One egg equals 1 oz (28 g).  ? 2-3 servings of low-fat dairy each day. One serving is 1 cup (237 mL).  ? 1 serving of nuts, seeds, or beans 5 times each week.  ? 2-3 servings of heart-healthy fats. Healthy fats called omega-3 fatty acids are found in foods such as walnuts, flaxseeds, fortified milks, and eggs. These fats are also found in cold-water fish, such as sardines, salmon, and mackerel.  · Limit how much you eat of:  ? Canned or prepackaged foods.  ? Food that is high in trans fat, such as some fried foods.  ? Food that is high in saturated fat, such as fatty meat.  ? Desserts and other sweets, sugary drinks, and other foods with added sugar.  ? Full-fat dairy products.  · Do not salt foods before eating.  · Do not eat more than 4 egg yolks a week.  · Try to eat at least 2 vegetarian meals a week.  · Eat more home-cooked food and less restaurant, buffet, and fast food.    Lifestyle  · When eating at a restaurant, ask that your food be prepared with less salt or no salt, if possible.  · If you drink alcohol:  ? Limit how much you use to:  § 0-1 drink a day for women who are not pregnant.  § 0-2 drinks a day for men.  ? Be aware of how much alcohol is in your drink. In the [place] one drink equals one 12 oz bottle of beer (355 mL), one 5 oz glass of wine (148 mL), or one 1½ oz glass of hard liquor (44 mL).  General information  · Avoid eating more than 2,300 mg of salt a day. If you have hypertension, you may need to reduce your sodium intake to 1,500 mg a day.  · Work with your health care provider to maintain a healthy body weight or to lose weight. Ask what an ideal weight is for you.  · Get at least 30 minutes of exercise that causes your heart to beat faster (aerobic exercise) most days of the week. Activities may include walking, swimming, or biking.  · Work with your health care provider or dietitian to adjust your eating plan to your individual calorie needs.  What  foods should I eat?  Fruits  All fresh, dried, or frozen fruit. Canned fruit in natural juice (without added sugar).  Vegetables  Fresh or frozen vegetables (raw, steamed, roasted, or grilled). Low-sodium or reduced-sodium tomato and vegetable juice. Low-sodium or reduced-sodium tomato sauce and tomato paste. Low-sodium or reduced-sodium canned vegetables.  Grains  Whole-grain or whole-wheat bread. Whole-grain or whole-wheat pasta. Brown rice. Oatmeal. Quinoa. Bulgur. Whole-grain and low-sodium cereals. Mica bread. Low-fat, low-sodium crackers. Whole-wheat flour tortillas.  Meats and other proteins  Skinless chicken or turkey. Ground chicken or turkey. Pork with fat trimmed off. Fish and seafood. Egg whites. Dried beans, peas, or lentils. Unsalted nuts, nut butters, and seeds. Unsalted canned beans. Lean cuts of beef with fat trimmed off. Low-sodium, lean precooked or cured meat, such as sausages or meat loaves.  Dairy  Low-fat (1%) or fat-free (skim) milk. Reduced-fat, low-fat, or fat-free cheeses. Nonfat, low-sodium ricotta or cottage cheese. Low-fat or nonfat yogurt. Low-fat, low-sodium cheese.  Fats and oils  Soft margarine without trans fats. Vegetable oil. Reduced-fat, low-fat, or light mayonnaise and salad dressings (reduced-sodium). Canola, safflower, olive, avocado, soybean, and sunflower oils. Avocado.  Seasonings and condiments  Herbs. Spices. Seasoning mixes without salt.  Other foods  Unsalted popcorn and pretzels. Fat-free sweets.  The items listed above may not be a complete list of foods and beverages you can eat. Contact a dietitian for more information.  What foods should I avoid?  Fruits  Canned fruit in a light or heavy syrup. Fried fruit. Fruit in cream or butter sauce.  Vegetables  Creamed or fried vegetables. Vegetables in a cheese sauce. Regular canned vegetables (not low-sodium or reduced-sodium). Regular canned tomato sauce and paste (not low-sodium or reduced-sodium). Regular tomato and  vegetable juice (not low-sodium or reduced-sodium). Pickles. Olives.  Grains  Baked goods made with fat, such as croissants, muffins, or some breads. Dry pasta or rice meal packs.  Meats and other proteins  Fatty cuts of meat. Ribs. Fried meat. Shea. Bologna, salami, and other precooked or cured meats, such as sausages or meat loaves. Fat from the back of a pig (fatback). Bratwurst. Salted nuts and seeds. Canned beans with added salt. Canned or smoked fish. Whole eggs or egg yolks. Chicken or turkey with skin.  Dairy  Whole or 2% milk, cream, and half-and-half. Whole or full-fat cream cheese. Whole-fat or sweetened yogurt. Full-fat cheese. Nondairy creamers. Whipped toppings. Processed cheese and cheese spreads.  Fats and oils  Butter. Stick margarine. Lard. Shortening. Ghee. Shea fat. Tropical oils, such as coconut, palm kernel, or palm oil.  Seasonings and condiments  Onion salt, garlic salt, seasoned salt, table salt, and sea salt. Worcestershire sauce. Tartar sauce. Barbecue sauce. Teriyaki sauce. Soy sauce, including reduced-sodium. Steak sauce. Canned and packaged gravies. Fish sauce. Oyster sauce. Cocktail sauce. Store-bought horseradish. Ketchup. Mustard. Meat flavorings and tenderizers. Bouillon cubes. Hot sauces. Pre-made or packaged marinades. Pre-made or packaged taco seasonings. Relishes. Regular salad dressings.  Other foods  Salted popcorn and pretzels.  The items listed above may not be a complete list of foods and beverages you should avoid. Contact a dietitian for more information.  Where to find more information  · National Heart, Lung, and Blood [place]: www.nhlbi.nih.gov  · American Heart Association: www.heart.org  · Academy of Nutrition and Dietetics: www.eatright.org  · National Kidney Foundation: www.kidney.org  Summary  · The DASH eating plan is a healthy eating plan that has been shown to reduce high blood pressure (hypertension). It may also reduce your risk for type 2 diabetes, heart  disease, and stroke.  · When on the DASH eating plan, aim to eat more fresh fruits and vegetables, whole grains, lean proteins, low-fat dairy, and heart-healthy fats.  · With the DASH eating plan, you should limit salt (sodium) intake to 2,300 mg a day. If you have hypertension, you may need to reduce your sodium intake to 1,500 mg a day.  · Work with your health care provider or dietitian to adjust your eating plan to your individual calorie needs.  This information is not intended to replace advice given to you by your health care provider. Make sure you discuss any questions you have with your health care provider.  Document Revised: 11/20/2020 Document Reviewed: 11/20/2020  [employer] Patient Education © 2021 [employer]      Advance Care Planning and Advance Directives     You make decisions on a daily basis - decisions about where you want to live, your career, your home, your life. Perhaps one of the most important decisions you face is your choice for future medical care. Take time to talk with your family and your healthcare team and start planning today.  Advance Care Planning is a process that can help you:  · Understand possible future healthcare decisions in light of your own experiences  · Reflect on those decision in light of your goals and values  · Discuss your decisions with those closest to you and the healthcare professionals that care for you  · Make a plan by creating a document that reflects your wishes    Surrogate Decision Maker  In the event of a medical emergency, which has left you unable to communicate or to make your own decisions, you would need someone to make decisions for you.  It is important to discuss your preferences for medical treatment with this person while you are in good health.     Qualities of a surrogate decision maker:  • Willing to take on this role and responsibility  • Knows what you want for future medical care  • Willing to follow your wishes even if they don't  agree with them  • Able to make difficult medical decisions under stressful circumstances    Advance Directives  These are legal documents you can create that will guide your healthcare team and decision maker(s) when needed. These documents can be stored in the electronic medical record.    · Living Will - a legal document to guide your care if you have a terminal condition or a serious illness and are unable to communicate. States vary by statute in document names/types, but most forms may include one or more of the following:        -  Directions regarding life-prolonging treatments        -  Directions regarding artificially provided nutrition/hydration        -  Choosing a healthcare decision maker        -  Direction regarding organ/tissue donation    · Durable Power of  for Healthcare - this document names an -in-fact to make medical decisions for you, but it may also allow this person to make personal and financial decisions for you. Please seek the advice of an  if you need this type of document.    **Advance Directives are not required and no one may discriminate against you if you do not sign one.    Medical Orders  Many states allow specific forms/orders signed by your physician to record your wishes for medical treatment in your current state of health. This form, signed in personal communication with your physician, addresses resuscitation and other medical interventions that you may or may not want.      For more information or to schedule a time with a [facility] Advance Care Planning Facilitator contact: [email or web address] or call [phone] and someone will contact you directly.

## 2022-01-03 ENCOUNTER — HOSPITAL ENCOUNTER (OUTPATIENT)
Dept: ULTRASOUND IMAGING | Facility: HOSPITAL | Age: 72
Discharge: HOME | End: 2022-01-03
Admitting: NURSE PRACTITIONER

## 2022-01-03 DIAGNOSIS — R20.2 NUMBNESS AND TINGLING OF BOTH FEET: ICD-10-CM

## 2022-01-03 DIAGNOSIS — R20.0 NUMBNESS AND TINGLING OF BOTH FEET: ICD-10-CM

## 2022-01-03 DIAGNOSIS — M79.672 FOOT PAIN, BILATERAL: ICD-10-CM

## 2022-01-03 DIAGNOSIS — M79.671 FOOT PAIN, BILATERAL: ICD-10-CM

## 2022-01-03 PROCEDURE — 93923 UPR/LXTR ART STDY 3+ LVLS: CPT

## 2022-01-04 ENCOUNTER — TELEPHONE (OUTPATIENT)
Dept: NEUROSURGERY | Facility: CLINIC | Age: 72
End: 2022-01-04

## 2022-01-04 DIAGNOSIS — M79.671 FOOT PAIN, BILATERAL: Primary | ICD-10-CM

## 2022-01-04 DIAGNOSIS — M79.672 FOOT PAIN, BILATERAL: Primary | ICD-10-CM

## 2022-01-04 NOTE — TELEPHONE ENCOUNTER
----- Message from PEGGY Wheeler sent at 1/4/2022  7:32 AM CST -----  Can you let him know that his vascular study was good and that we will refer to Dr Trent Rutledge with podiatry for foot eval  ----- Message -----  From: Interface, Rad Ditto In  Sent: 1/3/2022   4:20 PM CST  To: PEGGY Wheeler

## 2022-01-04 NOTE — TELEPHONE ENCOUNTER
Called patient to let him know that his vascular study looked good and that we would put in a referral for him to Dr. Trent Rutledge/ podiatrist, spoke with his wife Sandee and she will let him know.

## 2022-01-29 DIAGNOSIS — R20.0 NUMBNESS AND TINGLING OF BOTH FEET: ICD-10-CM

## 2022-01-29 DIAGNOSIS — R20.2 NUMBNESS AND TINGLING OF BOTH FEET: ICD-10-CM

## 2022-01-31 RX ORDER — GABAPENTIN 300 MG/1
CAPSULE ORAL
Qty: 90 CAPSULE | Refills: 1 | Status: SHIPPED | OUTPATIENT
Start: 2022-01-31

## 2022-02-09 ENCOUNTER — HOSPITAL ENCOUNTER (OUTPATIENT)
Dept: GENERAL RADIOLOGY | Facility: HOSPITAL | Age: 72
Discharge: HOME | End: 2022-02-09
Admitting: PODIATRIST

## 2022-02-09 ENCOUNTER — OFFICE VISIT (OUTPATIENT)
Dept: PODIATRY | Facility: CLINIC | Age: 72
End: 2022-02-09

## 2022-02-09 VITALS
HEART RATE: 34 BPM | BODY MASS INDEX: 32.64 KG/M2 | HEIGHT: 72 IN | SYSTOLIC BLOOD PRESSURE: 138 MMHG | DIASTOLIC BLOOD PRESSURE: 64 MMHG | OXYGEN SATURATION: 99 % | WEIGHT: 241 LBS

## 2022-02-09 DIAGNOSIS — M79.672 FOOT PAIN, BILATERAL: ICD-10-CM

## 2022-02-09 DIAGNOSIS — M79.671 FOOT PAIN, BILATERAL: ICD-10-CM

## 2022-02-09 DIAGNOSIS — G57.62 PLANTAR NEUROMA, LEFT: ICD-10-CM

## 2022-02-09 DIAGNOSIS — G57.61 PLANTAR NEUROMA, RIGHT: ICD-10-CM

## 2022-02-09 DIAGNOSIS — R20.0 NUMBNESS AND TINGLING OF BOTH FEET: Primary | ICD-10-CM

## 2022-02-09 DIAGNOSIS — R20.2 NUMBNESS AND TINGLING OF BOTH FEET: Primary | ICD-10-CM

## 2022-02-09 PROCEDURE — 99203 OFFICE O/P NEW LOW 30 MIN: CPT | Performed by: PODIATRIST

## 2022-02-09 PROCEDURE — 73630 X-RAY EXAM OF FOOT: CPT

## 2022-02-09 NOTE — PATIENT INSTRUCTIONS
Vieira Neuralgia    Vieira neuralgia is foot pain that affects the ball of the foot and the area near the toes. Vieira neuralgia occurs when part of a nerve in the foot (digital nerve) is under too much pressure (compressed). When this happens over a long period of time, the nerve can thicken (neuroma) and cause pain. Pain usually occurs between the third and fourth toes.   Vieira neuralgia can come and go but may get worse over time.  What are the causes?  This condition is caused by doing the same things over and over with your foot, such as:  · Activities such as running or jumping.  · Wearing shoes that are too tight.  What increases the risk?  You may be at higher risk for Vieira neuralgia if you:  · Are female.  · Wear high heels.  · Wear shoes that are narrow or tight.  · Do activities that repeatedly stretch your toes, such as:  ? Running.  ? Ballet.  ? Long-distance walking.  What are the signs or symptoms?  The first symptom of Vieira neuralgia is pain that spreads from the ball of the foot to the toes. It may feel like you are walking on a marble. Pain usually gets worse with walking and goes away at night. Other symptoms may include numbness and cramping of your toes. Both feet are equally affected, but rarely at the same time.  How is this diagnosed?  This condition is diagnosed based on your symptoms, your medical history, and a physical exam. Your health care provider may:  · Squeeze your foot just behind your toe.  · Ask you to move your toes to check for pain.  · Ask about your physical activity level.  You also may have imaging tests, such as an X-ray, ultrasound, or MRI.  How is this treated?  Treatment depends on how severe your condition is and what causes it. Treatment may involve:  · Wearing different shoes that are not too tight, are low-heeled, and provide good support. For some people, this is the only treatment needed.  · Wearing an over-the-counter or custom supportive pad (orthotic)  under the front of your foot.  · Getting injections of numbing medicine and anti-inflammatory medicine (steroid) in the nerve.  · Having surgery to remove part of the thickened nerve.  Follow these instructions at home:  Managing pain, stiffness, and swelling    · Massage your foot as needed.  · Wear orthotics as told by your health care provider.  · If directed, put ice on your foot:  ? Put ice in a plastic bag.  ? Place a towel between your skin and the bag.  ? Leave the ice on for 20 minutes, 2-3 times a day.  · Avoid activities that cause pain or make pain worse. If you play sports, ask your health care provider when it is safe for you to return to sports.  · Raise (elevate) your foot above the level of your heart while lying down and, when possible, while sitting.    General instructions  · Take over-the-counter and prescription medicines only as told by your health care provider.  · Do not drive or use heavy machinery while taking prescription pain medicine.  · Wear shoes that:  ? Have soft soles.  ? Have a wide toe area.  ? Provide arch support.  ? Do not pinch or squeeze your feet.  ? Have room for your orthotics, if applicable.  · Keep all follow-up visits as told by your health care provider. This is important.  Contact a health care provider if:  · Your symptoms get worse or do not get better with treatment and home care.  Summary  · Vieira neuralgia is foot pain that affects the ball of the foot and the area near the toes. Pain usually occurs between the third and fourth toes, gets worse with walking, and goes away at night.  · Vieira neuralgia occurs when part of a nerve in the foot (digital nerve) is under too much pressure. When this happens over a long period of time, the nerve can thicken (neuroma) and cause pain.  · This condition is caused by doing the same things over and over with your foot, such as running or jumping, wearing shoes that are too tight, or wearing high heels.  · Treatment may  involve wearing low-heeled shoes that are not too tight, wearing a supportive pad (orthotic) under the front of your foot, getting injections in the nerve, or having surgery to remove part of the thickened nerve.  This information is not intended to replace advice given to you by your health care provider. Make sure you discuss any questions you have with your health care provider.  Document Revised: 01/01/2019 Document Reviewed: 01/01/2019  [employer] Patient Education © 2021 [employer]

## 2022-02-16 ENCOUNTER — TELEPHONE (OUTPATIENT)
Dept: PODIATRY | Facility: CLINIC | Age: 72
End: 2022-02-16

## 2022-02-16 NOTE — TELEPHONE ENCOUNTER
Spoke with pt regarding xray results, explaine dthat   1. Short segment foreign body, fragment of metal wire embedded within  the plantar surface of the base of the third left toe. Clinical  correlation is recommended.  2. 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 No acute fracture  Pt stated understanding and thanked me for call

## 2022-03-14 NOTE — PROGRESS NOTES
[facility] - PODIATRY    Today's Date: 03/15/22    Patient Name: Mohit Holm  MRN: [id number]  CSN: [id number]  PCP: Desi Polo DO  Referring Provider: No ref. provider found    SUBJECTIVE     Chief Complaint   Patient presents with   • Follow-up     desi polo 11/30/2021  1 month f/u Numbness and tingling of both feet- pt states better than it was after getting the injection, still having some discomfort - pt pain, numbness - pt presents 1 month follow up from injection, still having pain in both feet     HPI: Mohit Holm, a 71 y.o.male, comes to clinic as a(n) new patient complaining of foot pain. Patient has h/o Arrhythmia, arthritis, colon cancer, COPD, HLD, HTN, lumbar radiculopathy. Patient is non-diabetic.  Relates that for over a year he has had numbness and tingling in his feet that causes discomfort when walking.  This pain is slowly worsened recently.  Denies any recent injury or trauma.  Does have history positive for lumbar and sacral degeneration noted on MRI.  He does follow with neurosurgery and had spinal injection a few weeks ago which has helped some of his pain but continues to have a localized areas of pain near the ball of each foot.  Admits pain at 6-7/10 level and described as sharp, numbness and tingling. Relates previous treatment(s) including metatarsal pads. Denies any constitutional symptoms. No other pedal complaints at this time.    Past Medical History:   Diagnosis Date   • Arrhythmia    • Arthritis    • Cancer (HCC)     colon   • COPD (chronic obstructive pulmonary disease) (HCC)    • Hyperlipidemia    • Hypertension    • Numbness of foot     Bilateral      Past Surgical History:   Procedure Laterality Date   • BACK SURGERY     • CARDIAC CATHETERIZATION N/A 12/10/2020    Procedure: Coronary angiography;  Surgeon: Lonny Wesley MD;  Location: [facility] CATH INVASIVE LOCATION;  Service: Cardiology;  Laterality: N/A;   • COLON RESECTION     • REPLACEMENT  TOTAL KNEE Right      Family History   Problem Relation Age of Onset   • Lung disease Mother    • Heart disease Father    • No Known Problems Sister    • No Known Problems Brother    • No Known Problems Sister    • No Known Problems Brother      Social History     Socioeconomic History   • Marital status:    Tobacco Use   • Smoking status: Former Smoker     Types: Cigarettes     Quit date:      Years since quittin.2   • Smokeless tobacco: Former User     Quit date:    Vaping Use   • Vaping Use: Never used   Substance and Sexual Activity   • Alcohol use: Not Currently     Comment: quit    • Drug use: Never   • Sexual activity: Defer     No Known Allergies  Current Outpatient Medications   Medication Sig Dispense Refill   • albuterol sulfate  (90 Base) MCG/ACT inhaler Inhale 2 puffs Every 4 (Four) Hours As Needed for Wheezing.     • aspirin 81 MG EC tablet Take 81 mg by mouth Daily.     • carvedilol (COREG) 3.125 MG tablet Take 3.125 mg by mouth 2 (two) times a day.     • esomeprazole (nexIUM) 20 MG capsule Take 20 mg by mouth Every Morning Before Breakfast.     • eszopiclone (LUNESTA) 2 MG tablet Take 2 mg by mouth Every Night. Take immediately before bedtime     • furosemide (LASIX) 20 MG tablet Take 20 mg by mouth Daily.     • gabapentin (NEURONTIN) 300 MG capsule TAKE 1 CAPSULE THREE TIMES A DAY 90 capsule 1   • lisinopril (PRINIVIL,ZESTRIL) 10 MG tablet Take 10 mg by mouth Daily.     • pravastatin (PRAVACHOL) 40 MG tablet Take 40 mg by mouth Daily.     • Trelegy Ellipta 100-62.5-25 MCG/INH aerosol powder  Inhale 1 puff Daily.       No current facility-administered medications for this visit.     Review of Systems   Constitutional: Negative for chills and fever.   HENT: Negative for congestion.    Respiratory: Negative for shortness of breath.    Cardiovascular: Negative for chest pain and leg swelling.   Gastrointestinal: Negative for constipation, diarrhea, nausea and vomiting.    Musculoskeletal: Positive for arthralgias and back pain.        Foot pain   Skin: Negative for wound.   Neurological: Positive for numbness.       OBJECTIVE     Vitals:    03/15/22 1520   BP: 135/84   Pulse: (!) 44   SpO2: 93%       PHYSICAL EXAM  GEN:   Accompanied by none.     Foot/Ankle Exam:       General:   Appearance: appears stated age and healthy    Orientation: AAOx3    Affect: appropriate    Gait: unimpaired    Assistance: independent    Shoe Gear:  Casual shoes    VASCULAR      Right Foot Vascularity   Dorsalis pedis:  2+  Posterior tibial:  2+  Skin Temperature: warm    Edema Grading:  None  CFT:  3  Pedal Hair Growth:  Present  Varicosities: none       Left Foot Vascularity   Dorsalis pedis:  2+  Posterior tibial:  2+  Skin Temperature: warm    Edema Grading:  None  CFT:  3  Pedal Hair Growth:  Present  Varicosities: none        NEUROLOGIC     Right Foot Neurologic   Light touch sensation:  Diminished  Vibratory sensation:  Diminished  Hot/Cold sensation: normal    Protective Sensation using [place]-Leeanna Monofilament:  7     Left Foot Neurologic   Light touch sensation:  Diminished  Vibratory sensation:  Diminished  Hot/cold sensation: diminished    Protective Sensation using [place]-Leeanna Monofilament:  7     MUSCULOSKELETAL      Right Foot Musculoskeletal   Ecchymosis:  None  Tenderness: metatarsals and neuroma    Tenderness comment:  Distal 3rd interspace  Arch:  Normal     Left Foot Musculoskeletal   Ecchymosis:  None  Tenderness: metatarsals and neuroma    Tenderness comment:  Distal 2nd interspace  Arch:  Normal     MUSCLE STRENGTH     Right Foot Muscle Strength   Foot dorsiflexion:  5  Foot plantar flexion:  5  Foot inversion:  5  Foot eversion:  5     Left Foot Muscle Strength   Foot dorsiflexion:  5  Foot plantar flexion:  5  Foot inversion:  5  Foot eversion:  5     RANGE OF MOTION      Right Foot Range of Motion   Foot and ankle ROM within normal limits       Left Foot Range of  Motion   Foot and ankle ROM within normal limits       DERMATOLOGIC     Right Foot Dermatologic   Skin: skin intact       Left Foot Dermatologic   Skin: skin intact        RADIOLOGY/NUCLEAR:  No results found.    LABORATORY/CULTURE RESULTS:      PATHOLOGY RESULTS:       ASSESSMENT/PLAN     Diagnoses and all orders for this visit:    1. Foot pain, bilateral (Primary)  -     triamcinolone acetonide (KENALOG-40) injection 20 mg  -     triamcinolone acetonide (KENALOG-40) injection 20 mg  -     dexamethasone (DECADRON) injection 10 mg  -     dexamethasone (DECADRON) injection 10 mg  -     bupivacaine (MARCAINE) 0.5 % injection 1 mL  -     bupivacaine (MARCAINE) 0.5 % injection 1 mL    2. Plantar neuroma, left  -     Nerve Block    3. Plantar neuroma, right  -     Nerve Block    4. Numbness and tingling of both feet      Comprehensive lower extremity examination and evaluation was performed.  Discussed findings and treatment plan including risks, benefits, and treatment options with patient in detail. Patient agreed with treatment plan.  Reviewed xrays with patient.  While patient does have lumbar radiculopathy, he continues to have isolated plantar neuromas of each foot that causes pain.   Continue metatarsal pads in bilateral shoes.  After written consent obtained, bilateral neuroma injections performed as documented in the procedure notes.  Continue follow-up with pain management and neurosurgery.  An After Visit Summary was printed and given to the patient at discharge, including (if requested) any available informative/educational handouts regarding diagnosis, treatment, or medications. All questions were answered to patient/family satisfaction. Should symptoms fail to improve or worsen they agree to call or return to clinic or to go to the Emergency Department. Discussed the importance of following up with any needed screening tests/labs/specialist appointments and any requested follow-up recommended by me today.  Importance of maintaining follow-up discussed and patient accepts that missed appointments can delay diagnosis and potentially lead to worsening of conditions.  Return in about 1 month (around 4/15/2022) for Follow-up with Dr. Rutledge., or sooner if acute issues arise.    Nerve Block    Date/Time: 3/15/2022 3:55 PM  Performed by: René Rutledge DPM  Authorized by: René Rutledge DPM   Indications: pain relief  Body area: lower extremity  Nerve: plantar  Laterality: right    Sedation:  Patient sedated: no    Preparation: Patient was prepped and draped in the usual sterile fashion.  Patient position: sitting  Needle size: 25 G  Location technique: anatomical landmarks  Local Anesthetic: bupivacaine 0.5% without epinephrine  Anesthetic total: 1 mL  Outcome: pain improved  Patient tolerance: patient tolerated the procedure well with no immediate complications  Comments: 1 cc Dexamethasone, 0.5 cc Kenalog 40    Nerve Block    Date/Time: 3/15/2022 3:56 PM  Performed by: René Rutledge DPM  Authorized by: René Rutledge DPM   Indications: pain relief  Body area: lower extremity  Nerve: plantar  Laterality: left    Sedation:  Patient sedated: no    Preparation: Patient was prepped and draped in the usual sterile fashion.  Patient position: sitting  Needle size: 25 G  Location technique: anatomical landmarks  Local Anesthetic: bupivacaine 0.5% without epinephrine  Anesthetic total: 1 mL  Outcome: pain improved  Patient tolerance: patient tolerated the procedure well with no immediate complications  Comments: 1 cc Dexamethasone, 0.5 cc Kenalog 40          This document has been electronically signed by René Rutledge DPM on March 15, 2022 16:12 CDT

## 2022-03-15 ENCOUNTER — OFFICE VISIT (OUTPATIENT)
Dept: PODIATRY | Facility: CLINIC | Age: 72
End: 2022-03-15

## 2022-03-15 VITALS
WEIGHT: 244.8 LBS | SYSTOLIC BLOOD PRESSURE: 135 MMHG | DIASTOLIC BLOOD PRESSURE: 84 MMHG | OXYGEN SATURATION: 93 % | HEART RATE: 44 BPM | BODY MASS INDEX: 33.16 KG/M2 | HEIGHT: 72 IN

## 2022-03-15 DIAGNOSIS — M79.671 FOOT PAIN, BILATERAL: Primary | ICD-10-CM

## 2022-03-15 DIAGNOSIS — G57.61 PLANTAR NEUROMA, RIGHT: ICD-10-CM

## 2022-03-15 DIAGNOSIS — R20.0 NUMBNESS AND TINGLING OF BOTH FEET: ICD-10-CM

## 2022-03-15 DIAGNOSIS — G57.62 PLANTAR NEUROMA, LEFT: ICD-10-CM

## 2022-03-15 DIAGNOSIS — R20.2 NUMBNESS AND TINGLING OF BOTH FEET: ICD-10-CM

## 2022-03-15 DIAGNOSIS — M79.672 FOOT PAIN, BILATERAL: Primary | ICD-10-CM

## 2022-03-15 PROCEDURE — 64455 NJX AA&/STRD PLTR COM DG NRV: CPT | Performed by: PODIATRIST

## 2022-03-15 RX ORDER — DEXAMETHASONE SODIUM PHOSPHATE 10 MG/ML
10 INJECTION INTRAMUSCULAR; INTRAVENOUS ONCE
Status: COMPLETED | OUTPATIENT
Start: 2022-03-15 | End: 2022-03-15

## 2022-03-15 RX ORDER — BUPIVACAINE HYDROCHLORIDE 5 MG/ML
1 INJECTION, SOLUTION PERINEURAL ONCE
Status: COMPLETED | OUTPATIENT
Start: 2022-03-15 | End: 2022-03-15

## 2022-03-15 RX ORDER — TRIAMCINOLONE ACETONIDE 40 MG/ML
20 INJECTION, SUSPENSION INTRA-ARTICULAR; INTRAMUSCULAR ONCE
Status: COMPLETED | OUTPATIENT
Start: 2022-03-15 | End: 2022-03-15

## 2022-03-15 RX ADMIN — TRIAMCINOLONE ACETONIDE 20 MG: 40 INJECTION, SUSPENSION INTRA-ARTICULAR; INTRAMUSCULAR at 16:03

## 2022-03-15 RX ADMIN — BUPIVACAINE HYDROCHLORIDE 1 ML: 5 INJECTION, SOLUTION PERINEURAL at 16:04

## 2022-03-15 RX ADMIN — BUPIVACAINE HYDROCHLORIDE 1 ML: 5 INJECTION, SOLUTION PERINEURAL at 16:05

## 2022-03-15 RX ADMIN — DEXAMETHASONE SODIUM PHOSPHATE 10 MG: 10 INJECTION INTRAMUSCULAR; INTRAVENOUS at 16:04

## 2022-04-19 ENCOUNTER — OFFICE VISIT (OUTPATIENT)
Dept: PODIATRY | Facility: CLINIC | Age: 72
End: 2022-04-19

## 2022-04-19 VITALS — HEIGHT: 72 IN | WEIGHT: 253.6 LBS | HEART RATE: 40 BPM | OXYGEN SATURATION: 99 % | BODY MASS INDEX: 34.35 KG/M2

## 2022-04-19 DIAGNOSIS — M79.672 FOOT PAIN, BILATERAL: Primary | ICD-10-CM

## 2022-04-19 DIAGNOSIS — G57.61 PLANTAR NEUROMA, RIGHT: ICD-10-CM

## 2022-04-19 DIAGNOSIS — R20.0 NUMBNESS AND TINGLING OF BOTH FEET: ICD-10-CM

## 2022-04-19 DIAGNOSIS — M79.671 FOOT PAIN, BILATERAL: Primary | ICD-10-CM

## 2022-04-19 DIAGNOSIS — R20.2 NUMBNESS AND TINGLING OF BOTH FEET: ICD-10-CM

## 2022-04-19 DIAGNOSIS — M20.5X2 HALLUX LIMITUS, ACQUIRED, LEFT: ICD-10-CM

## 2022-04-19 DIAGNOSIS — G57.62 PLANTAR NEUROMA, LEFT: ICD-10-CM

## 2022-04-19 PROCEDURE — 99213 OFFICE O/P EST LOW 20 MIN: CPT | Performed by: PODIATRIST

## 2022-04-19 NOTE — PROGRESS NOTES
[facility] - PODIATRY    Today's Date: 04/19/22    Patient Name: Mohit Holm  MRN: [id number]  CSN: [id number]  PCP: Vipin Polo DO  Referring Provider: No ref. provider found    SUBJECTIVE     Chief Complaint   Patient presents with   • Follow-up     Vipin Polo DO pcp11/30/2021 1 month [facility] foot care Foot pain, bilateral- pt states feet about the same, still hurt, shots didn't help- pt pain 7/10 at worst when walking- pt presents with poss bunion on inside left foot, no other visible issues     HPI: Mohit Holm, a 71 y.o.male, comes to clinic as a(n) new patient complaining of foot pain. Patient has h/o Arrhythmia, arthritis, colon cancer, COPD, HLD, HTN, lumbar radiculopathy. Patient is non-diabetic.  Relates that for over a year he has had numbness and tingling in his feet that causes discomfort when walking.  This pain is slowly worsened recently.  Denies any recent injury or trauma.  Does have history positive for lumbar and sacral degeneration noted on MRI.  He does follow with neurosurgery and had spinal injection a few weeks ago which has helped some of his pain but continues to have a localized areas of pain near the ball of each foot. Pain management has increased his Gabapentin and notes some mild improvement. He did have bilateral neuroma injections last visit. Did have mild improvement at each site but did not change the overall numbness and tingling in his feet.  Admits pain at 7/10 level and described as sharp, numbness and tingling. Relates previous treatment(s) including metatarsal pads. Denies any constitutional symptoms. No other pedal complaints at this time.    Past Medical History:   Diagnosis Date   • Arrhythmia    • Arthritis    • Cancer (HCC)     colon   • COPD (chronic obstructive pulmonary disease) (HCC)    • Hyperlipidemia    • Hypertension    • Numbness of foot     Bilateral      Past Surgical History:   Procedure Laterality Date   • BACK SURGERY      • CARDIAC CATHETERIZATION N/A 12/10/2020    Procedure: Coronary angiography;  Surgeon: Lonny Wesley MD;  Location:  [facility] CATH INVASIVE LOCATION;  Service: Cardiology;  Laterality: N/A;   • COLON RESECTION     • REPLACEMENT TOTAL KNEE Right      Family History   Problem Relation Age of Onset   • Lung disease Mother    • Heart disease Father    • No Known Problems Sister    • No Known Problems Brother    • No Known Problems Sister    • No Known Problems Brother      Social History     Socioeconomic History   • Marital status:    Tobacco Use   • Smoking status: Former Smoker     Types: Cigarettes     Quit date:      Years since quittin.3   • Smokeless tobacco: Former User     Quit date:    Vaping Use   • Vaping Use: Never used   Substance and Sexual Activity   • Alcohol use: Not Currently     Comment: quit    • Drug use: Never   • Sexual activity: Defer     No Known Allergies  Current Outpatient Medications   Medication Sig Dispense Refill   • albuterol sulfate  (90 Base) MCG/ACT inhaler Inhale 2 puffs Every 4 (Four) Hours As Needed for Wheezing.     • aspirin 81 MG EC tablet Take 81 mg by mouth Daily.     • carvedilol (COREG) 3.125 MG tablet Take 3.125 mg by mouth 2 (two) times a day.     • esomeprazole (nexIUM) 20 MG capsule Take 20 mg by mouth Every Morning Before Breakfast.     • eszopiclone (LUNESTA) 2 MG tablet Take 2 mg by mouth Every Night. Take immediately before bedtime     • furosemide (LASIX) 20 MG tablet Take 20 mg by mouth Daily.     • gabapentin (NEURONTIN) 300 MG capsule TAKE 1 CAPSULE THREE TIMES A DAY 90 capsule 1   • lisinopril (PRINIVIL,ZESTRIL) 10 MG tablet Take 10 mg by mouth Daily.     • pravastatin (PRAVACHOL) 40 MG tablet Take 40 mg by mouth Daily.     • Trelegy Ellipta 100-62.5-25 MCG/INH aerosol powder  Inhale 1 puff Daily.       No current facility-administered medications for this visit.     Review of Systems   Constitutional: Negative for chills and  fever.   HENT: Negative for congestion.    Respiratory: Negative for shortness of breath.    Cardiovascular: Negative for chest pain and leg swelling.   Gastrointestinal: Negative for constipation, diarrhea, nausea and vomiting.   Musculoskeletal: Positive for arthralgias and back pain.        Foot pain   Skin: Negative for wound.   Neurological: Positive for numbness.       OBJECTIVE     Vitals:    04/19/22 1345   Pulse: (!) 40   SpO2: 99%       PHYSICAL EXAM  GEN:   Accompanied by none.     Foot/Ankle Exam:       General:   Appearance: appears stated age and healthy    Orientation: AAOx3    Affect: appropriate    Gait: unimpaired    Assistance: independent    Shoe Gear:  Casual shoes    VASCULAR      Right Foot Vascularity   Dorsalis pedis:  2+  Posterior tibial:  2+  Skin Temperature: warm    Edema Grading:  None  CFT:  3  Pedal Hair Growth:  Present  Varicosities: mild varicosities       Left Foot Vascularity   Dorsalis pedis:  2+  Posterior tibial:  2+  Skin Temperature: warm    Edema Grading:  None  CFT:  3  Pedal Hair Growth:  Present  Varicosities: mild varicosities        NEUROLOGIC     Right Foot Neurologic   Light touch sensation:  Diminished  Vibratory sensation:  Diminished  Hot/Cold sensation: diminished    Protective Sensation using [place]-Leeanna Monofilament:  7     Left Foot Neurologic   Light touch sensation:  Diminished  Vibratory sensation:  Diminished  Hot/cold sensation: diminished    Protective Sensation using [place]-Leeanna Monofilament:  7     MUSCULOSKELETAL      Right Foot Musculoskeletal   Ecchymosis:  None  Tenderness: metatarsals, neuroma and 1st MTPJ    Tenderness comment:  Distal 3rd interspace  Arch:  Normal     Left Foot Musculoskeletal   Ecchymosis:  None  Tenderness: metatarsals, neuroma and 1st MTJP    Tenderness comment:  Distal 2nd interspace  Arch:  Normal  Hallux limitus: Yes (bony enlargement to joint)       MUSCLE STRENGTH     Right Foot Muscle Strength   Foot  dorsiflexion:  5  Foot plantar flexion:  5  Foot inversion:  5  Foot eversion:  5     Left Foot Muscle Strength   Foot dorsiflexion:  5  Foot plantar flexion:  5  Foot inversion:  5  Foot eversion:  5     RANGE OF MOTION      Right Foot Range of Motion   Foot and ankle ROM within normal limits       Left Foot Range of Motion   Foot and ankle ROM within normal limits       DERMATOLOGIC     Right Foot Dermatologic   Skin: skin intact       Left Foot Dermatologic   Skin: skin intact       Image:       RADIOLOGY/NUCLEAR:  No results found.    LABORATORY/CULTURE RESULTS:      PATHOLOGY RESULTS:       ASSESSMENT/PLAN     Diagnoses and all orders for this visit:    1. Foot pain, bilateral (Primary)    2. Numbness and tingling of both feet    3. Plantar neuroma, left    4. Plantar neuroma, right    5. Hallux limitus, acquired, left      Comprehensive lower extremity examination and evaluation was performed.  Discussed findings and treatment plan including risks, benefits, and treatment options with patient in detail. Patient agreed with treatment plan.  Reviewed xrays with patient.  While patient does have lumbar radiculopathy, he continues to have isolated plantar neuromas of each foot.   Continue metatarsal pads in bilateral shoes.  Discussed neuroma excisions as well as hallux limitus repair, but ultimately I feel that his pain continues to be neuropathic in nature and that foot surgery would only be minimally beneficial. Suggest follow-up with pain management and neurosurgery.  If isolated foot pains worsen, may consider surgery at that time.   An After Visit Summary was printed and given to the patient at discharge, including (if requested) any available informative/educational handouts regarding diagnosis, treatment, or medications. All questions were answered to patient/family satisfaction. Should symptoms fail to improve or worsen they agree to call or return to clinic or to go to the Emergency Department. Discussed  the importance of following up with any needed screening tests/labs/specialist appointments and any requested follow-up recommended by me today. Importance of maintaining follow-up discussed and patient accepts that missed appointments can delay diagnosis and potentially lead to worsening of conditions.  Return if symptoms worsen or fail to improve, for Follow-up with Dr. Rutledge., or sooner if acute issues arise.    Procedures    This document has been electronically signed by René Rutledge DPM on April 19, 2022 14:16 CDT

## 2022-05-05 ENCOUNTER — OFFICE VISIT (OUTPATIENT)
Dept: NEUROSURGERY | Facility: CLINIC | Age: 72
End: 2022-05-05

## 2022-05-05 VITALS — BODY MASS INDEX: 33.43 KG/M2 | WEIGHT: 246.8 LBS | HEIGHT: 72 IN

## 2022-05-05 DIAGNOSIS — R20.0 NUMBNESS AND TINGLING OF BOTH FEET: ICD-10-CM

## 2022-05-05 DIAGNOSIS — E66.09 CLASS 1 OBESITY DUE TO EXCESS CALORIES WITH BODY MASS INDEX (BMI) OF 33.0 TO 33.9 IN ADULT, UNSPECIFIED WHETHER SERIOUS COMORBIDITY PRESENT: ICD-10-CM

## 2022-05-05 DIAGNOSIS — G60.9 HEREDITARY AND IDIOPATHIC PERIPHERAL NEUROPATHY: Primary | ICD-10-CM

## 2022-05-05 DIAGNOSIS — R20.2 NUMBNESS AND TINGLING OF BOTH FEET: ICD-10-CM

## 2022-05-05 DIAGNOSIS — Z87.891 FORMER SMOKER: ICD-10-CM

## 2022-05-05 PROBLEM — E66.811 CLASS 1 OBESITY DUE TO EXCESS CALORIES WITH BODY MASS INDEX (BMI) OF 33.0 TO 33.9 IN ADULT: Status: ACTIVE | Noted: 2022-05-05

## 2022-05-05 PROCEDURE — 99213 OFFICE O/P EST LOW 20 MIN: CPT | Performed by: NEUROLOGICAL SURGERY

## 2022-05-05 RX ORDER — GABAPENTIN 600 MG/1
TABLET ORAL
COMMUNITY
Start: 2022-03-29

## 2022-05-05 RX ORDER — GUAIFENESIN 600 MG/1
TABLET, EXTENDED RELEASE ORAL
COMMUNITY
Start: 2022-04-20

## 2022-05-05 RX ORDER — PREGABALIN 75 MG/1
CAPSULE ORAL
COMMUNITY
Start: 2022-04-26

## 2022-05-05 NOTE — PROGRESS NOTES
SUBJECTIVE:  Patient ID: Mohit Holm is a 71 y.o. male is here today for follow-up.    Chief Complaint: Back pain  Chief Complaint   Patient presents with   • NUMBNESS/TINGLING BILATERAL FEET     Patient is here for follow up after seeing pain mgmt (Dr Fernández).  [place] [facility] [place]  Patient saw Dr Fernández and Dr Trent Rutledge - both believe this is neuropathy and nothing to do other than treat medically.       HPI  71-year-old gentleman with the following: Back pain and bilateral numbness on the soles of his feet.  He was seen by Dr. Rutledge and has been following with Dr. Fernández.  They both feel that he has peripheral neuropathy.  He did a dedicated course of physical therapy which was somewhat helpful for his low back.  He also has gotten an injection treatment from Dr. Fernández which was also helpful for his low back.  He has been switched from Neurontin to Lyrica for his foot numbness.  He does not think the Lyrica was as effective as the Neurontin had been.    The following portions of the patient's history were reviewed and updated as appropriate: allergies, current medications, past family history, past medical history, past social history, past surgical history and problem list.    OBJECTIVE:    Review of Systems   All other systems reviewed and are negative.         Physical Exam  Eyes:      Extraocular Movements: EOM normal.      Pupils: Pupils are equal, round, and reactive to light.   Neurological:      Mental Status: He is oriented to person, place, and time.      Coordination: Finger-Nose-Finger Test normal.      Gait: Gait is intact.      Deep Tendon Reflexes: Strength normal.   Psychiatric:         Speech: Speech normal.         Neurologic Exam     Mental Status   Oriented to person, place, and time.   Attention: normal.   Speech: speech is normal   Level of consciousness: alert  Knowledge: good.     Cranial Nerves     CN II   Visual fields full to confrontation.     CN III, IV, VI   Pupils are  equal, round, and reactive to light.  Extraocular motions are normal.     CN V   Facial sensation intact.     CN VII   Facial expression full, symmetric.     CN VIII   CN VIII normal.     CN IX, X   CN IX normal.   CN X normal.     CN XI   CN XI normal.     CN XII   CN XII normal.     Motor Exam   Muscle bulk: normal  Overall muscle tone: normal  Right arm pronator drift: absent  Left arm pronator drift: absent    Strength   Strength 5/5 throughout.     Sensory Exam   Light touch normal.   Pinprick normal.     Gait, Coordination, and Reflexes     Gait  Gait: normal    Coordination   Finger to nose coordination: normal    Tremor   Resting tremor: absent  Intention tremor: absent  Action tremor: absent    Reflexes   Reflexes 2+ except as noted.       Independent Review of Radiographic Studies:   Plain x-rays and MRI of the lumbar spine shows multilevel spondylosis    ASSESSMENT/PLAN:  The patient complains of some low back pain and has had success with physical therapy and an injection from Dr. Fernández.  His biggest complaint however is the numbness in the soles of his feet.  I do not think this is related to his back.  He does not require any lumbar spine surgery.  He is going to continue to work with Dr. Fernández to manage his symptom control.      1. Hereditary and idiopathic peripheral neuropathy    2. Numbness and tingling of both feet    3. Former smoker    4. Class 1 obesity due to excess calories with body mass index (BMI) of 33.0 to 33.9 in adult, unspecified whether serious comorbidity present        The patient's Body mass index is 33.47 kg/m².. BMI is above normal parameters. Recommendations include: educational material    Return if symptoms worsen or fail to improve.      Luis Perry MD

## 2022-05-05 NOTE — PATIENT INSTRUCTIONS
"PATIENT TO CONTINUE TO FOLLOW UP WITH HIS PRIMARY CARE PROVIDER FOR YEARLY PHYSICAL EXAMS TO ENSURE COMPLETE HEALTH MAINTENANCE      BMI for Adults  What is BMI?  Body mass index (BMI) is a number that is calculated from a person's weight and height. BMI can help estimate how much of a person's weight is composed of fat. BMI does not measure body fat directly. Rather, it is an alternative to procedures that directly measure body fat, which can be difficult and expensive.  BMI can help identify people who may be at higher risk for certain medical problems.  What are BMI measurements used for?  BMI is used as a screening tool to identify possible weight problems. It helps determine whether a person is obese, overweight, a healthy weight, or underweight.  BMI is useful for:  Identifying a weight problem that may be related to a medical condition or may increase the risk for medical problems.  Promoting changes, such as changes in diet and exercise, to help reach a healthy weight. BMI screening can be repeated to see if these changes are working.  How is BMI calculated?  BMI involves measuring your weight in relation to your height. Both height and weight are measured, and the BMI is calculated from those numbers. This can be done either in English ([place] or metric measurements. Note that charts and online BMI calculators are available to help you find your BMI quickly and easily without having to do these calculations yourself.  To calculate your BMI in English ([place] measurements:    Measure your weight in pounds (lb).  Multiply the number of pounds by 703.  For example, for a person who weighs 180 lb, multiply that number by 703, which equals 126,540.  Measure your height in inches. Then multiply that number by itself to get a measurement called \"inches squared.\"  For example, for a person who is 70 inches tall, the \"inches squared\" measurement is 70 inches x 70 inches, which equals 4,900 inches squared.  Divide the " "total from step 2 (number of lb x 703) by the total from step 3 (inches squared): 126,540 ÷ 4,900 = 25.8. This is your BMI.    To calculate your BMI in metric measurements:  Measure your weight in kilograms (kg).  Measure your height in meters (m). Then multiply that number by itself to get a measurement called \"meters squared.\"  For example, for a person who is 1.75 m tall, the \"meters squared\" measurement is 1.75 m x 1.75 m, which is equal to 3.1 meters squared.  Divide the number of kilograms (your weight) by the meters squared number. In this example: 70 ÷ 3.1 = 22.6. This is your BMI.  What do the results mean?  BMI charts are used to identify whether you are underweight, normal weight, overweight, or obese. The following guidelines will be used:  Underweight: BMI less than 18.5.  Normal weight: BMI between 18.5 and 24.9.  Overweight: BMI between 25 and 29.9.  Obese: BMI of 30 or above.  Keep these notes in mind:  Weight includes both fat and muscle, so someone with a muscular build, such as an athlete, may have a BMI that is higher than 24.9. In cases like these, BMI is not an accurate measure of body fat.  To determine if excess body fat is the cause of a BMI of 25 or higher, further assessments may need to be done by a health care provider.  BMI is usually interpreted in the same way for men and women.  Where to find more information  For more information about BMI, including tools to quickly calculate your BMI, go to these websites:  Centers for Disease Control and Prevention: www.cdc.gov  American Heart Association: www.heart.org  National Heart, Lung, and Blood [place]: www.nhlbi.nih.gov  Summary  Body mass index (BMI) is a number that is calculated from a person's weight and height.  BMI may help estimate how much of a person's weight is composed of fat. BMI can help identify those who may be at higher risk for certain medical problems.  BMI can be measured using English measurements or metric " "measurements.  BMI charts are used to identify whether you are underweight, normal weight, overweight, or obese.  This information is not intended to replace advice given to you by your health care provider. Make sure you discuss any questions you have with your health care provider.  Document Revised: 09/09/2020 Document Reviewed: 07/17/2020  [employer] Patient Education © 2021 [employer]  https://www.nhlbi.nih.gov/files/docs/public/heart/dash_brief.pdf\">   DASH Eating Plan  DASH stands for Dietary Approaches to Stop Hypertension. The DASH eating plan is a healthy eating plan that has been shown to:  Reduce high blood pressure (hypertension).  Reduce your risk for type 2 diabetes, heart disease, and stroke.  Help with weight loss.  What are tips for following this plan?  Reading food labels  Check food labels for the amount of salt (sodium) per serving. Choose foods with less than 5 percent of the Daily Value of sodium. Generally, foods with less than 300 milligrams (mg) of sodium per serving fit into this eating plan.  To find whole grains, look for the word \"whole\" as the first word in the ingredient list.  Shopping  Buy products labeled as \"low-sodium\" or \"no salt added.\"  Buy fresh foods. Avoid canned foods and pre-made or frozen meals.  Cooking  Avoid adding salt when cooking. Use salt-free seasonings or herbs instead of table salt or sea salt. Check with your health care provider or pharmacist before using salt substitutes.  Do not cagle foods. Cook foods using healthy methods such as baking, boiling, grilling, roasting, and broiling instead.  Cook with heart-healthy oils, such as olive, canola, avocado, soybean, or sunflower oil.  Meal planning    Eat a balanced diet that includes:  4 or more servings of fruits and 4 or more servings of vegetables each day. Try to fill one-half of your plate with fruits and vegetables.  6-8 servings of whole grains each day.  Less than 6 oz (170 g) of lean meat, poultry, or " fish each day. A 3-oz (85-g) serving of meat is about the same size as a deck of cards. One egg equals 1 oz (28 g).  2-3 servings of low-fat dairy each day. One serving is 1 cup (237 mL).  1 serving of nuts, seeds, or beans 5 times each week.  2-3 servings of heart-healthy fats. Healthy fats called omega-3 fatty acids are found in foods such as walnuts, flaxseeds, fortified milks, and eggs. These fats are also found in cold-water fish, such as sardines, salmon, and mackerel.  Limit how much you eat of:  Canned or prepackaged foods.  Food that is high in trans fat, such as some fried foods.  Food that is high in saturated fat, such as fatty meat.  Desserts and other sweets, sugary drinks, and other foods with added sugar.  Full-fat dairy products.  Do not salt foods before eating.  Do not eat more than 4 egg yolks a week.  Try to eat at least 2 vegetarian meals a week.  Eat more home-cooked food and less restaurant, buffet, and fast food.    Lifestyle  When eating at a restaurant, ask that your food be prepared with less salt or no salt, if possible.  If you drink alcohol:  Limit how much you use to:  0-1 drink a day for women who are not pregnant.  0-2 drinks a day for men.  Be aware of how much alcohol is in your drink. In the [place] one drink equals one 12 oz bottle of beer (355 mL), one 5 oz glass of wine (148 mL), or one 1½ oz glass of hard liquor (44 mL).  General information  Avoid eating more than 2,300 mg of salt a day. If you have hypertension, you may need to reduce your sodium intake to 1,500 mg a day.  Work with your health care provider to maintain a healthy body weight or to lose weight. Ask what an ideal weight is for you.  Get at least 30 minutes of exercise that causes your heart to beat faster (aerobic exercise) most days of the week. Activities may include walking, swimming, or biking.  Work with your health care provider or dietitian to adjust your eating plan to your individual calorie  needs.  What foods should I eat?  Fruits  All fresh, dried, or frozen fruit. Canned fruit in natural juice (without added sugar).  Vegetables  Fresh or frozen vegetables (raw, steamed, roasted, or grilled). Low-sodium or reduced-sodium tomato and vegetable juice. Low-sodium or reduced-sodium tomato sauce and tomato paste. Low-sodium or reduced-sodium canned vegetables.  Grains  Whole-grain or whole-wheat bread. Whole-grain or whole-wheat pasta. Brown rice. Oatmeal. Quinoa. Bulgur. Whole-grain and low-sodium cereals. Mica bread. Low-fat, low-sodium crackers. Whole-wheat flour tortillas.  Meats and other proteins  Skinless chicken or turkey. Ground chicken or turkey. Pork with fat trimmed off. Fish and seafood. Egg whites. Dried beans, peas, or lentils. Unsalted nuts, nut butters, and seeds. Unsalted canned beans. Lean cuts of beef with fat trimmed off. Low-sodium, lean precooked or cured meat, such as sausages or meat loaves.  Dairy  Low-fat (1%) or fat-free (skim) milk. Reduced-fat, low-fat, or fat-free cheeses. Nonfat, low-sodium ricotta or cottage cheese. Low-fat or nonfat yogurt. Low-fat, low-sodium cheese.  Fats and oils  Soft margarine without trans fats. Vegetable oil. Reduced-fat, low-fat, or light mayonnaise and salad dressings (reduced-sodium). Canola, safflower, olive, avocado, soybean, and sunflower oils. Avocado.  Seasonings and condiments  Herbs. Spices. Seasoning mixes without salt.  Other foods  Unsalted popcorn and pretzels. Fat-free sweets.  The items listed above may not be a complete list of foods and beverages you can eat. Contact a dietitian for more information.  What foods should I avoid?  Fruits  Canned fruit in a light or heavy syrup. Fried fruit. Fruit in cream or butter sauce.  Vegetables  Creamed or fried vegetables. Vegetables in a cheese sauce. Regular canned vegetables (not low-sodium or reduced-sodium). Regular canned tomato sauce and paste (not low-sodium or reduced-sodium). Regular  tomato and vegetable juice (not low-sodium or reduced-sodium). Pickles. Olives.  Grains  Baked goods made with fat, such as croissants, muffins, or some breads. Dry pasta or rice meal packs.  Meats and other proteins  Fatty cuts of meat. Ribs. Fried meat. Shea. Bologna, salami, and other precooked or cured meats, such as sausages or meat loaves. Fat from the back of a pig (fatback). Bratwurst. Salted nuts and seeds. Canned beans with added salt. Canned or smoked fish. Whole eggs or egg yolks. Chicken or turkey with skin.  Dairy  Whole or 2% milk, cream, and half-and-half. Whole or full-fat cream cheese. Whole-fat or sweetened yogurt. Full-fat cheese. Nondairy creamers. Whipped toppings. Processed cheese and cheese spreads.  Fats and oils  Butter. Stick margarine. Lard. Shortening. Ghee. Shea fat. Tropical oils, such as coconut, palm kernel, or palm oil.  Seasonings and condiments  Onion salt, garlic salt, seasoned salt, table salt, and sea salt. Worcestershire sauce. Tartar sauce. Barbecue sauce. Teriyaki sauce. Soy sauce, including reduced-sodium. Steak sauce. Canned and packaged gravies. Fish sauce. Oyster sauce. Cocktail sauce. Store-bought horseradish. Ketchup. Mustard. Meat flavorings and tenderizers. Bouillon cubes. Hot sauces. Pre-made or packaged marinades. Pre-made or packaged taco seasonings. Relishes. Regular salad dressings.  Other foods  Salted popcorn and pretzels.  The items listed above may not be a complete list of foods and beverages you should avoid. Contact a dietitian for more information.  Where to find more information  National Heart, Lung, and Blood [place]: www.nhlbi.nih.gov  American Heart Association: www.heart.org  Academy of Nutrition and Dietetics: www.eatright.org  National Kidney Foundation: www.kidney.org  Summary  The DASH eating plan is a healthy eating plan that has been shown to reduce high blood pressure (hypertension). It may also reduce your risk for type 2 diabetes,  heart disease, and stroke.  When on the DASH eating plan, aim to eat more fresh fruits and vegetables, whole grains, lean proteins, low-fat dairy, and heart-healthy fats.  With the DASH eating plan, you should limit salt (sodium) intake to 2,300 mg a day. If you have hypertension, you may need to reduce your sodium intake to 1,500 mg a day.  Work with your health care provider or dietitian to adjust your eating plan to your individual calorie needs.  This information is not intended to replace advice given to you by your health care provider. Make sure you discuss any questions you have with your health care provider.  Document Revised: 11/20/2020 Document Reviewed: 11/20/2020  Elsevier Patient Education © 2021 [employer]

## 2022-06-17 ENCOUNTER — TELEPHONE (OUTPATIENT)
Dept: NEUROSURGERY | Facility: CLINIC | Age: 72
End: 2022-06-17

## 2022-06-17 NOTE — TELEPHONE ENCOUNTER
Spoke with wife of patient who stated patient does not need imaging [place] [facility] [place] so I have placed it into [facility] for shred.

## 2023-09-20 NOTE — PROGRESS NOTES
[facility] - PODIATRY    Today's Date: 02/09/22    Patient Name: Mohit Holm  MRN: [id number]  [facility]: [id number]  PCP: Desi Serrano DO  Referring Provider: Michael Masters APRN    SUBJECTIVE     Chief Complaint   Patient presents with   • Establish Care     desi serrano 11/30/2021 NEW PATIENT-  BILATERAL FOOT PAIN - IMAGING;- pt states ball area, numb/tingle/burn/hurts, both feet. been going on for about a year just got worse recently- pt pain 8/10 at times, most time 5/10- pt presents with no visible issues     HPI: Mohit Holm, a 71 y.o.male, comes to clinic as a(n) new patient complaining of foot pain. Patient has h/o Arrhythmia, arthritis, colon cancer, COPD, HLD, HTN, lumbar radiculopathy. Patient is non-diabetic.  Relates that for over a year he has had numbness and tingling in his feet that causes discomfort when walking.  This pain is slowly worsened recently.  Denies any recent injury or trauma.  Does have history positive for lumbar and sacral degeneration noted on MRI.  He does follow with neurosurgery and has an appointment with pain management later this week.  Admits pain at 5-8/10 level and described as aching, numbness and tingling. Denies previous treatment. Denies any constitutional symptoms. No other pedal complaints at this time.    Past Medical History:   Diagnosis Date   • Arrhythmia    • Arthritis    • Cancer (HCC)     colon   • COPD (chronic obstructive pulmonary disease) (HCC)    • Hyperlipidemia    • Hypertension    • Numbness of foot     Bilateral      Past Surgical History:   Procedure Laterality Date   • BACK SURGERY     • CARDIAC CATHETERIZATION N/A 12/10/2020    Procedure: Coronary angiography;  Surgeon: Lonny Wesley MD;  Location: [facility] INVASIVE LOCATION;  Service: Cardiology;  Laterality: N/A;   • COLON RESECTION     • REPLACEMENT TOTAL KNEE Right      Family History   Problem Relation Age of Onset   • Lung disease Mother    • Heart disease  Father    • No Known Problems Sister    • No Known Problems Brother    • No Known Problems Sister    • No Known Problems Brother      Social History     Socioeconomic History   • Marital status:    Tobacco Use   • Smoking status: Former Smoker     Types: Cigarettes     Quit date:      Years since quittin.1   • Smokeless tobacco: Former User     Quit date:    Vaping Use   • Vaping Use: Never used   Substance and Sexual Activity   • Alcohol use: Not Currently     Comment: quit    • Drug use: Never   • Sexual activity: Defer     No Known Allergies  Current Outpatient Medications   Medication Sig Dispense Refill   • albuterol sulfate  (90 Base) MCG/ACT inhaler Inhale 2 puffs Every 4 (Four) Hours As Needed for Wheezing.     • aspirin 81 MG EC tablet Take 81 mg by mouth Daily.     • carvedilol (COREG) 3.125 MG tablet Take 3.125 mg by mouth 2 (two) times a day.     • esomeprazole (nexIUM) 20 MG capsule Take 20 mg by mouth Every Morning Before Breakfast.     • eszopiclone (LUNESTA) 2 MG tablet Take 2 mg by mouth Every Night. Take immediately before bedtime     • furosemide (LASIX) 20 MG tablet Take 20 mg by mouth Daily.     • gabapentin (NEURONTIN) 300 MG capsule TAKE 1 CAPSULE THREE TIMES A DAY 90 capsule 1   • lisinopril (PRINIVIL,ZESTRIL) 10 MG tablet Take 10 mg by mouth Daily.     • pravastatin (PRAVACHOL) 40 MG tablet Take 40 mg by mouth Daily.     • Trelegy Ellipta 100-62.5-25 MCG/INH aerosol powder  Inhale 1 puff Daily.       No current facility-administered medications for this visit.     Review of Systems   Constitutional: Negative for chills and fever.   HENT: Negative for congestion.    Respiratory: Negative for shortness of breath.    Cardiovascular: Negative for chest pain and leg swelling.   Gastrointestinal: Negative for constipation, diarrhea, nausea and vomiting.   Musculoskeletal: Positive for arthralgias and back pain.        Foot pain   Skin: Negative for wound.    Neurological: Positive for numbness.       OBJECTIVE     Vitals:    02/09/22 1104   BP: 138/64   Pulse: (!) 34   SpO2: 99%       PHYSICAL EXAM  GEN:   Accompanied by none.     Foot/Ankle Exam:       General:   Appearance: appears stated age and healthy    Orientation: AAOx3    Affect: appropriate    Gait: unimpaired    Assistance: independent    Shoe Gear:  Casual shoes    VASCULAR      Right Foot Vascularity   Dorsalis pedis:  2+  Posterior tibial:  2+  Skin Temperature: warm    Edema Grading:  None  CFT:  3  Pedal Hair Growth:  Present  Varicosities: none       Left Foot Vascularity   Dorsalis pedis:  2+  Posterior tibial:  2+  Skin Temperature: warm    Edema Grading:  None  CFT:  3  Pedal Hair Growth:  Present  Varicosities: none        NEUROLOGIC     Right Foot Neurologic   Light touch sensation:  Diminished  Vibratory sensation:  Diminished  Hot/Cold sensation: normal    Protective Sensation using [place]anna Monofilament:  7     Left Foot Neurologic   Light touch sensation:  Diminished  Vibratory sensation:  Diminished  Hot/cold sensation: diminished    Protective Sensation using [place]-Leeanna Monofilament:  7     MUSCULOSKELETAL      Right Foot Musculoskeletal   Ecchymosis:  None  Tenderness: metatarsals and neuroma    Tenderness comment:  Distal 3rd interspace  Arch:  Normal     Left Foot Musculoskeletal   Ecchymosis:  None  Tenderness: metatarsals and neuroma    Tenderness comment:  Distal 2nd interspace  Arch:  Normal     MUSCLE STRENGTH     Right Foot Muscle Strength   Foot dorsiflexion:  5  Foot plantar flexion:  5  Foot inversion:  5  Foot eversion:  5     Left Foot Muscle Strength   Foot dorsiflexion:  5  Foot plantar flexion:  5  Foot inversion:  5  Foot eversion:  5     RANGE OF MOTION      Right Foot Range of Motion   Foot and ankle ROM within normal limits       Left Foot Range of Motion   Foot and ankle ROM within normal limits       DERMATOLOGIC     Right Foot Dermatologic   Skin: skin  intact       Left Foot Dermatologic   Skin: skin intact        RADIOLOGY/NUCLEAR:  XR Foot 3+ View Bilateral    Result Date: 2/9/2022  Narrative: EXAMINATION: XR FOOT 3+ VW BILATERAL- 2/9/2022 2:10 PM CST  HISTORY: foot pain; M79.671-Pain in right foot; M79.672-Pain in left foot.  REPORT: Weightbearing views of both feet were obtained, 3 views of each foot.  COMPARISON: X-rays of the right foot 9/1/2020.  On the right, there is overgrowth of the first metatarsal head with spurring and spurring of the base of the proximal phalanx of the great toe with narrowing of the first MTP joint. This appears stable. No fracture is identified. The other joint spaces are preserved. An inferior calcaneal enthesophyte is present.  On the left, inferior calcaneal enthesophyte is present. There is overgrowth of the first metatarsal head with spurring and narrowing of the first MTP joint. There is a short segment foreign body metal wire embedded within the plantar surface of the base of the third toe. Clinical correlation is recommended.      Impression: 1. Short segment foreign body, fragment of metal wire embedded within the plantar surface of the base of the third left toe. Clinical correlation is recommended. 2. Advanced arthritic change at the first MTP joints bilaterally as described above. No acute fracture. This report was finalized on 02/09/2022 14:12 by Dr. Lonny Clemens MD.      LABORATORY/CULTURE RESULTS:      PATHOLOGY RESULTS:       ASSESSMENT/PLAN     Diagnoses and all orders for this visit:    1. Numbness and tingling of both feet (Primary)    2. Foot pain, bilateral  -     XR Foot 3+ View Bilateral; Future    3. Plantar neuroma, left    4. Plantar neuroma, right      Comprehensive lower extremity examination and evaluation was performed.  Discussed findings and treatment plan including risks, benefits, and treatment options with patient in detail. Patient agreed with treatment plan.  Ultimately, I feel that the  majority of the patient's pain is due to his lumbar spine degeneration.  He does have findings consistent with bilateral plantar neuromas that may also contribute to his foot pain.  Due to the potential for spinal injections at pain management, will defer neuroma injections today.   Place metatarsal pads in bilateral shoes.  X-rays for further evaluation.  Continue follow-up with pain management and neurosurgery.  An After Visit Summary was printed and given to the patient at discharge, including (if requested) any available informative/educational handouts regarding diagnosis, treatment, or medications. All questions were answered to patient/family satisfaction. Should symptoms fail to improve or worsen they agree to call or return to clinic or to go to the Emergency Department. Discussed the importance of following up with any needed screening tests/labs/specialist appointments and any requested follow-up recommended by me today. Importance of maintaining follow-up discussed and patient accepts that missed appointments can delay diagnosis and potentially lead to worsening of conditions.  Return in about 1 month (around 3/9/2022) for Follow-up with Dr. Rutledge., or sooner if acute issues arise.        This document has been electronically signed by René Rutledge DPM on February 9, 2022 14:18 CST        Stage 2: Additional Anesthesia Type: 1% lidocaine with epinephrine

## 2024-08-30 NOTE — PROGRESS NOTES
Subjective    Mr. Holm is 73 y.o. male    Chief Complaint: BPH    History of Present Illness  Patient referred for BPH.  Most recent PSA 2.1.  Patient currently on Flomax (one year duration) and Finasteride (6 month duration). AUA 27/35 with incomplete emptying, frequency, intermittency, urgency, weak stream, straining, nocutria X 1.  Denies UTI, gross hematuria, urinary retention.   PSA- 2.1 (4/11/2024)  The following portions of the patient's history were reviewed and updated as appropriate: allergies, current medications, past family history, past medical history, past social history, past surgical history and problem list.    Review of Systems      Current Outpatient Medications:     albuterol sulfate  (90 Base) MCG/ACT inhaler, Inhale 2 puffs Every 4 (Four) Hours As Needed for Wheezing., Disp: , Rfl:     Belbuca 600 MCG film, , Disp: , Rfl:     celecoxib (CeleBREX) 100 MG capsule, , Disp: , Rfl:     esomeprazole (nexIUM) 20 MG capsule, Take 1 capsule by mouth Every Morning Before Breakfast., Disp: , Rfl:     finasteride (PROSCAR) 5 MG tablet, , Disp: , Rfl:     PreviDent 5000 Booster Plus 1.1 % paste, , Disp: , Rfl:     tamsulosin (FLOMAX) 0.4 MG capsule 24 hr capsule, , Disp: , Rfl:     Trelegy Ellipta 100-62.5-25 MCG/INH aerosol powder , Inhale 1 puff Daily., Disp: , Rfl:     Past Medical History:   Diagnosis Date    Arrhythmia     Arthritis     Cancer     colon    COPD (chronic obstructive pulmonary disease)     Erectile dysfunction 2010    Hyperlipidemia     Hypertension     Numbness of foot     Bilateral     Urinary incontinence 2024       Past Surgical History:   Procedure Laterality Date    BACK SURGERY      CARDIAC CATHETERIZATION N/A 12/10/2020    Procedure: Coronary angiography;  Surgeon: Lonny Wesley MD;  Location:  [facility];  Service: Cardiology;  Laterality: N/A;    COLON RESECTION      REPLACEMENT TOTAL KNEE Right        Social History     Socioeconomic History     "Marital status:    Tobacco Use    Smoking status: Former     Current packs/day: 0.00     Types: Cigarettes     Quit date:      Years since quittin.7    Smokeless tobacco: Former     Quit date:    Vaping Use    Vaping status: Never Used   Substance and Sexual Activity    Alcohol use: Not Currently     Comment: quit     Drug use: Never    Sexual activity: Not Currently     Partners: Female       Family History   Problem Relation Age of Onset    Lung disease Mother     Heart disease Father     No Known Problems Sister     No Known Problems Brother     No Known Problems Sister     No Known Problems Brother        Objective    Temp 98 °F (36.7 °C)   Ht 182.9 cm (72\")   Wt 118 kg (259 lb 12.8 oz)   BMI 35.24 kg/m²     Physical Exam        Results for orders placed or performed in visit on 24   POC Urinalysis Dipstick, Multipro    Specimen: Urine   Result Value Ref Range    Color Yellow Yellow, Straw, Dark Yellow, Zoie    Clarity, UA Clear Clear    Glucose, UA Negative Negative mg/dL    Bilirubin Negative Negative    Ketones, UA Negative Negative    Specific Gravity  1.025 1.005 - 1.030    Blood, UA Trace (A) Negative    pH, Urine 5.5 5.0 - 8.0    Protein, POC Negative Negative mg/dL    Urobilinogen, UA 0.2 E.U./dL Normal, 0.2 E.U./dL    Nitrite, UA Negative Negative    Leukocytes Negative Negative     IPSS Questionnaire (AUA-7):  Incomplete emptying  Over the past month, how often have you had a sensation of not emptying your bladder completely after you finished urinating?: Almost always (24)  Frequency  Over the past month, how often have you had to urinate again less than two hours after you finishied urinating ?: Almost always (24)  Intermittency  Over the past month, how often have you found you stopped and started again several time when you urinated ?: Almost always (24)  Urgency  Over the last month, how often have you found it difficult  have you " found it difficult to postpone urination ?: Almost always (09/09/24 0830)  Weak Stream  Over the past month, how often have you had a weak urinary stream ?: Almost always (09/09/24 0830)  Straining  Over the past month, how often have you had to push or strain to begin urination ?: Less than 1 time in 5 (09/09/24 0830)  Nocturia  Over the past month, how many times did you most typically get up to urinate from the time you went to bed until the time you got up in the morning ?: 1 time (09/09/24 0830)  Quality of life due to urinary symptoms  If you were to spend the rest of your life with your urinary condition the way it is now, how would feel about that?: Unhappy (09/09/24 0830)    Scores  Total IPSS Score: (!) 27 (09/09/24 0830)  Total Score = Symptomatic Level: Severely symptomatic: 20-35 (09/09/24 0830)      Estimation of residual urine via abdominal ultrasound  Residual Urine: 11 ml  Indication: BPH  Position: Supine  Examination: Incremental scanning of the suprapubic area using 3 MHz transducer using copious amounts of acoustic gel.   Findings: An anechoic area was demonstrated which represented the bladder, with measurement of residual urine as noted. I inspected this myself. In that the residual urine was stable or insignificant, no treatment will be necessary at this time.       Assessment and Plan    Diagnoses and all orders for this visit:    1. BPH with obstruction/lower urinary tract symptoms (Primary)  -     POC Urinalysis Dipstick, Multipro        Patient has failed dual therapy.  Will plan for cystoscopy uroflow same day.  He will need an ultrasound as well.  We discussed aqua ablation in depth today.  Patient is interested and we will proceed with the plan as outlined above.

## 2024-09-09 ENCOUNTER — OFFICE VISIT (OUTPATIENT)
Dept: UROLOGY | Facility: CLINIC | Age: 74
End: 2024-09-09
Payer: MEDICARE

## 2024-09-09 VITALS — WEIGHT: 259.8 LBS | TEMPERATURE: 98 F | HEIGHT: 72 IN | BODY MASS INDEX: 35.19 KG/M2

## 2024-09-09 DIAGNOSIS — N40.1 BPH WITH OBSTRUCTION/LOWER URINARY TRACT SYMPTOMS: Primary | ICD-10-CM

## 2024-09-09 DIAGNOSIS — N13.8 BPH WITH OBSTRUCTION/LOWER URINARY TRACT SYMPTOMS: Primary | ICD-10-CM

## 2024-09-09 LAB
BILIRUB BLD-MCNC: NEGATIVE MG/DL
CLARITY, POC: CLEAR
COLOR UR: YELLOW
GLUCOSE UR STRIP-MCNC: NEGATIVE MG/DL
KETONES UR QL: NEGATIVE
LEUKOCYTE EST, POC: NEGATIVE
NITRITE UR-MCNC: NEGATIVE MG/ML
PH UR: 5.5 [PH] (ref 5–8)
PROT UR STRIP-MCNC: NEGATIVE MG/DL
RBC # UR STRIP: ABNORMAL /UL
SP GR UR: 1.02 (ref 1–1.03)
UROBILINOGEN UR QL: ABNORMAL

## 2024-09-09 RX ORDER — CELECOXIB 100 MG/1
CAPSULE ORAL
COMMUNITY
Start: 2024-01-01

## 2024-09-09 RX ORDER — FINASTERIDE 5 MG/1
TABLET, FILM COATED ORAL
COMMUNITY

## 2024-09-09 RX ORDER — SODIUM FLUORIDE 6 MG/ML
PASTE, DENTIFRICE DENTAL
COMMUNITY
Start: 2024-07-10

## 2024-09-09 RX ORDER — TAMSULOSIN HYDROCHLORIDE 0.4 MG/1
CAPSULE ORAL
COMMUNITY

## 2024-09-09 RX ORDER — BUPRENORPHINE HYDROCHLORIDE 600 UG/1
FILM, SOLUBLE BUCCAL
COMMUNITY
Start: 2023-01-12

## 2024-09-18 ENCOUNTER — PROCEDURE VISIT (OUTPATIENT)
Dept: UROLOGY | Facility: CLINIC | Age: 74
End: 2024-09-18
Payer: MEDICARE

## 2024-09-18 DIAGNOSIS — N13.8 BPH WITH OBSTRUCTION/LOWER URINARY TRACT SYMPTOMS: Primary | ICD-10-CM

## 2024-09-18 DIAGNOSIS — N40.1 BPH WITH OBSTRUCTION/LOWER URINARY TRACT SYMPTOMS: Primary | ICD-10-CM

## 2024-09-27 ENCOUNTER — PROCEDURE VISIT (OUTPATIENT)
Dept: UROLOGY | Facility: CLINIC | Age: 74
End: 2024-09-27
Payer: MEDICARE

## 2024-09-27 DIAGNOSIS — N40.1 BPH WITH OBSTRUCTION/LOWER URINARY TRACT SYMPTOMS: Primary | ICD-10-CM

## 2024-09-27 DIAGNOSIS — N13.8 BPH WITH OBSTRUCTION/LOWER URINARY TRACT SYMPTOMS: Primary | ICD-10-CM

## 2024-09-27 RX ORDER — SODIUM CHLORIDE 9 MG/ML
100 INJECTION, SOLUTION INTRAVENOUS CONTINUOUS
OUTPATIENT
Start: 2024-09-27

## 2024-09-27 RX ORDER — SODIUM CHLORIDE 0.9 % (FLUSH) 0.9 %
10 SYRINGE (ML) INJECTION EVERY 12 HOURS SCHEDULED
OUTPATIENT
Start: 2024-09-27

## 2024-09-27 RX ORDER — SODIUM CHLORIDE 9 MG/ML
40 INJECTION, SOLUTION INTRAVENOUS AS NEEDED
OUTPATIENT
Start: 2024-09-27

## 2024-09-27 RX ORDER — SODIUM CHLORIDE 0.9 % (FLUSH) 0.9 %
1-10 SYRINGE (ML) INJECTION AS NEEDED
OUTPATIENT
Start: 2024-09-27

## 2024-10-10 ENCOUNTER — PRE-ADMISSION TESTING (OUTPATIENT)
Dept: PREADMISSION TESTING | Facility: HOSPITAL | Age: 74
End: 2024-10-10
Payer: MEDICARE

## 2024-10-10 VITALS
WEIGHT: 252.21 LBS | OXYGEN SATURATION: 93 % | BODY MASS INDEX: 34.16 KG/M2 | HEIGHT: 72 IN | HEART RATE: 65 BPM | DIASTOLIC BLOOD PRESSURE: 85 MMHG | RESPIRATION RATE: 16 BRPM | SYSTOLIC BLOOD PRESSURE: 142 MMHG

## 2024-10-10 LAB
ANION GAP SERPL CALCULATED.3IONS-SCNC: 9 MMOL/L (ref 5–15)
BUN SERPL-MCNC: 15 MG/DL (ref 8–23)
BUN/CREAT SERPL: 17 (ref 7–25)
CALCIUM SPEC-SCNC: 8.7 MG/DL (ref 8.6–10.5)
CHLORIDE SERPL-SCNC: 102 MMOL/L (ref 98–107)
CO2 SERPL-SCNC: 26 MMOL/L (ref 22–29)
CREAT SERPL-MCNC: 0.88 MG/DL (ref 0.76–1.27)
DEPRECATED RDW RBC AUTO: 44.1 FL (ref 37–54)
EGFRCR SERPLBLD CKD-EPI 2021: 91.4 ML/MIN/1.73
ERYTHROCYTE [DISTWIDTH] IN BLOOD BY AUTOMATED COUNT: 13.8 % (ref 12.3–15.4)
GLUCOSE SERPL-MCNC: 102 MG/DL (ref 65–99)
HCT VFR BLD AUTO: 40.6 % (ref 37.5–51)
HGB BLD-MCNC: 13.5 G/DL (ref 13–17.7)
MCH RBC QN AUTO: 29.2 PG (ref 26.6–33)
MCHC RBC AUTO-ENTMCNC: 33.3 G/DL (ref 31.5–35.7)
MCV RBC AUTO: 87.7 FL (ref 79–97)
PLATELET # BLD AUTO: 282 10*3/MM3 (ref 140–450)
PMV BLD AUTO: 8.9 FL (ref 6–12)
POTASSIUM SERPL-SCNC: 3.8 MMOL/L (ref 3.5–5.2)
RBC # BLD AUTO: 4.63 10*6/MM3 (ref 4.14–5.8)
SODIUM SERPL-SCNC: 137 MMOL/L (ref 136–145)
WBC NRBC COR # BLD AUTO: 7.94 10*3/MM3 (ref 3.4–10.8)

## 2024-10-10 PROCEDURE — 80048 BASIC METABOLIC PNL TOTAL CA: CPT

## 2024-10-10 PROCEDURE — 93005 ELECTROCARDIOGRAM TRACING: CPT

## 2024-10-10 PROCEDURE — 93010 ELECTROCARDIOGRAM REPORT: CPT | Performed by: INTERNAL MEDICINE

## 2024-10-10 PROCEDURE — 36415 COLL VENOUS BLD VENIPUNCTURE: CPT

## 2024-10-10 PROCEDURE — 85027 COMPLETE CBC AUTOMATED: CPT

## 2024-10-10 RX ORDER — GABAPENTIN 600 MG/1
600 TABLET ORAL 3 TIMES DAILY
COMMUNITY

## 2024-10-10 NOTE — DISCHARGE INSTRUCTIONS
Preparing for Surgery  Follow these instructions before the procedure:  Several days or weeks before your procedure  Medication(s) you need to stop   FOLLOW DR PERAZA'S OFFICE INSTRUCTIONS RE: CELEBREX     Ask your health care provider about:  Changing or stopping your regular medicines. This is especially important if you are taking diabetes medicines or blood thinners.  Taking medicines such as aspirin and ibuprofen. These medicines can thin your blood. Do not take these medicines unless your health care provider tells you to take them.  Taking over-the-counter medicines, vitamins, herbs, and supplements.    Contact your surgeon if you:  Develop a fever of more than 100.4°F (38°C) or other feelings of illness during the 48 hours before your surgery.  Have symptoms that get worse.  Have questions or concerns about your surgery.  If you are going home the same day of your surgery you will need to arrange for a responsible adult, age 18 years old or older, to drive you home from the hospital and stay with you for 24 hours. Verification of the  will be made prior to any procedure requiring sedation. You may not go home in a taxi or any form of public transportation by yourself.     Day before your procedure  Medication(s) you need to stop the day before your surgery:BELBUCA    24 hours before your procedure DO NOT drink alcoholic beverages or smoke.  24 hours before your procedure STOP taking Erectile Dysfunction medication (i.e.,Cialis, Viagra)     Day of your procedure   You may take the following medication(s) the morning of surgery with a sip of water: NEXIUM, GABAPENTIN      8 hours before your scheduled arrival time, STOP all food, any dairy products, and full liquids. This includes hard candy, chewing gum or mints. This is extremely important to prevent serious complications.     Up to 2 hours before your scheduled arrival time, you may have clear liquids no cream, powder, or pulp of any kind. Safe options  are water, black coffee, plain tea, soda, Gatorade/Powerade, clear broth, apple juice.    2 hours before your scheduled arrival time, STOP drinking clear liquids.    You may need to take another shower with a germ-killing soap before you leave home in the morning. Do not use perfumes, colognes, or body lotions.  Wear comfortable loose-fitting clothing.  Remove all jewelry including body piercing and rings, dark colored nail polish, and make up prior to arrival at the hospital. Leave all valuables at home.   Bring your hearing aids if you rely on them.  Do not wear contact lenses. If you wear eyeglasses remember to bring a case to store them in while you are in surgery.  Do not use denture adhesives since you will be asked to remove them during your surgery.    You do not need to bring your home medications into the hospital.   Bring your sleep apnea device with you on the day of your surgery (if this applies to you).  If you wear portable oxygen, bring it with you.   If you are staying overnight, you may bring a bag of items you may need such as slippers, robe and a change of clothes for your discharge. You may want to leave these items in the car until you are ready for them since your family will take your belongings when you leave the pre-operative area.  Arrive at the hospital as scheduled by the office. You will be asked to arrive 2 hours prior to your surgery time in order to prepare for your procedure.  When you arrive at the hospital  Go to the registration desk located at the main entrance of the hospital.  After registration is completed, you will be given a beeper and a sticker sheet. Take the stickers to Outpatient Surgery and place in the tray at the end of the desk to notify the staff that you have arrived and registered.   Return to the lobby to wait. You are not always called back according to the time of arrival but rather the time your doctor will be ready.  When your beeper lights up and vibrates  proceed through the double doors, under the stairs, and a member of the Outpatient Surgery staff will escort you to your preoperative room.

## 2024-10-13 LAB
QT INTERVAL: 428 MS
QTC INTERVAL: 468 MS

## 2024-10-15 ENCOUNTER — TELEPHONE (OUTPATIENT)
Dept: UROLOGY | Facility: CLINIC | Age: 74
End: 2024-10-15
Payer: MEDICARE

## 2024-10-15 NOTE — TELEPHONE ENCOUNTER
Called patient to remind them to arrive at patient registration on 10/17 at 0500 for the procedure with Dr. Blacmkon. Spoke with patient. Also reminded pt to do one fleet enema tomorrow night. Told patient if they had any questions to please contact our office at [phone].

## 2024-10-17 ENCOUNTER — HOSPITAL ENCOUNTER (OUTPATIENT)
Facility: HOSPITAL | Age: 74
Discharge: HOME | End: 2024-10-18
Attending: UROLOGY | Admitting: UROLOGY
Payer: MEDICARE

## 2024-10-17 ENCOUNTER — ANESTHESIA (OUTPATIENT)
Dept: PERIOP | Facility: HOSPITAL | Age: 74
End: 2024-10-17
Payer: MEDICARE

## 2024-10-17 ENCOUNTER — ANESTHESIA EVENT (OUTPATIENT)
Dept: PERIOP | Facility: HOSPITAL | Age: 74
End: 2024-10-17
Payer: MEDICARE

## 2024-10-17 DIAGNOSIS — N13.8 BPH WITH OBSTRUCTION/LOWER URINARY TRACT SYMPTOMS: ICD-10-CM

## 2024-10-17 DIAGNOSIS — N40.1 BPH WITH OBSTRUCTION/LOWER URINARY TRACT SYMPTOMS: ICD-10-CM

## 2024-10-17 PROCEDURE — 25010000002 CEFAZOLIN PER 500 MG: Performed by: UROLOGY

## 2024-10-17 PROCEDURE — 25010000002 PROPOFOL 10 MG/ML EMULSION: Performed by: NURSE ANESTHETIST, CERTIFIED REGISTERED

## 2024-10-17 PROCEDURE — 63710000001 OXYBUTYNIN XL 5 MG TABLET SUSTAINED-RELEASE 24 HOUR: Performed by: UROLOGY

## 2024-10-17 PROCEDURE — 25010000002 SUGAMMADEX 200 MG/2ML SOLUTION: Performed by: NURSE ANESTHETIST, CERTIFIED REGISTERED

## 2024-10-17 PROCEDURE — 63710000001 GABAPENTIN 300 MG CAPSULE: Performed by: UROLOGY

## 2024-10-17 PROCEDURE — 25010000002 FENTANYL CITRATE (PF) 100 MCG/2ML SOLUTION: Performed by: NURSE ANESTHETIST, CERTIFIED REGISTERED

## 2024-10-17 PROCEDURE — 25010000002 DEXAMETHASONE PER 1 MG: Performed by: NURSE ANESTHETIST, CERTIFIED REGISTERED

## 2024-10-17 PROCEDURE — 25810000003 LACTATED RINGERS PER 1000 ML: Performed by: UROLOGY

## 2024-10-17 PROCEDURE — A9270 NON-COVERED ITEM OR SERVICE: HCPCS | Performed by: UROLOGY

## 2024-10-17 PROCEDURE — 25010000002 LIDOCAINE PF 2% 2 % SOLUTION: Performed by: NURSE ANESTHETIST, CERTIFIED REGISTERED

## 2024-10-17 PROCEDURE — A9270 NON-COVERED ITEM OR SERVICE: HCPCS | Performed by: ANESTHESIOLOGY

## 2024-10-17 PROCEDURE — 0421T PR TRANSURETHRAL WATERJET ABLATION PROSTATE COMPL: CPT | Performed by: UROLOGY

## 2024-10-17 PROCEDURE — 88305 TISSUE EXAM BY PATHOLOGIST: CPT | Performed by: UROLOGY

## 2024-10-17 PROCEDURE — 25010000002 FENTANYL CITRATE (PF) 50 MCG/ML SOLUTION: Performed by: ANESTHESIOLOGY

## 2024-10-17 PROCEDURE — 63710000001 HYDROCODONE-ACETAMINOPHEN 10-325 MG TABLET: Performed by: ANESTHESIOLOGY

## 2024-10-17 PROCEDURE — 88342 IMHCHEM/IMCYTCHM 1ST ANTB: CPT | Performed by: UROLOGY

## 2024-10-17 PROCEDURE — C2596 PROBE, ROBOTIC, WATER-JET: HCPCS | Performed by: UROLOGY

## 2024-10-17 PROCEDURE — 25010000002 ONDANSETRON PER 1 MG: Performed by: NURSE ANESTHETIST, CERTIFIED REGISTERED

## 2024-10-17 PROCEDURE — 63710000001 HYOSCYAMINE 0.125 MG SUBLINGUAL TABLET: Performed by: UROLOGY

## 2024-10-17 RX ORDER — PHENYLEPHRINE HCL IN 0.9% NACL 1 MG/10 ML
SYRINGE (ML) INTRAVENOUS AS NEEDED
Status: DISCONTINUED | OUTPATIENT
Start: 2024-10-17 | End: 2024-10-17 | Stop reason: SURG

## 2024-10-17 RX ORDER — HYDROCODONE BITARTRATE AND ACETAMINOPHEN 5; 325 MG/1; MG/1
1 TABLET ORAL EVERY 4 HOURS PRN
Status: DISCONTINUED | OUTPATIENT
Start: 2024-10-17 | End: 2024-10-18 | Stop reason: HOSPADM

## 2024-10-17 RX ORDER — NALOXONE HCL 0.4 MG/ML
0.4 VIAL (ML) INJECTION AS NEEDED
Status: DISCONTINUED | OUTPATIENT
Start: 2024-10-17 | End: 2024-10-17 | Stop reason: HOSPADM

## 2024-10-17 RX ORDER — ROCURONIUM BROMIDE 10 MG/ML
INJECTION, SOLUTION INTRAVENOUS AS NEEDED
Status: DISCONTINUED | OUTPATIENT
Start: 2024-10-17 | End: 2024-10-17 | Stop reason: SURG

## 2024-10-17 RX ORDER — OXYBUTYNIN CHLORIDE 5 MG/1
5 TABLET, EXTENDED RELEASE ORAL DAILY
Status: DISCONTINUED | OUTPATIENT
Start: 2024-10-17 | End: 2024-10-18 | Stop reason: HOSPADM

## 2024-10-17 RX ORDER — DEXAMETHASONE SODIUM PHOSPHATE 4 MG/ML
INJECTION, SOLUTION INTRA-ARTICULAR; INTRALESIONAL; INTRAMUSCULAR; INTRAVENOUS; SOFT TISSUE AS NEEDED
Status: DISCONTINUED | OUTPATIENT
Start: 2024-10-17 | End: 2024-10-17 | Stop reason: SURG

## 2024-10-17 RX ORDER — HYDROCODONE BITARTRATE AND ACETAMINOPHEN 10; 325 MG/1; MG/1
1 TABLET ORAL EVERY 4 HOURS PRN
Status: DISCONTINUED | OUTPATIENT
Start: 2024-10-17 | End: 2024-10-17 | Stop reason: HOSPADM

## 2024-10-17 RX ORDER — PROPOFOL 10 MG/ML
VIAL (ML) INTRAVENOUS AS NEEDED
Status: DISCONTINUED | OUTPATIENT
Start: 2024-10-17 | End: 2024-10-17 | Stop reason: SURG

## 2024-10-17 RX ORDER — LIDOCAINE HYDROCHLORIDE 10 MG/ML
0.5 INJECTION, SOLUTION EPIDURAL; INFILTRATION; INTRACAUDAL; PERINEURAL ONCE AS NEEDED
Status: DISCONTINUED | OUTPATIENT
Start: 2024-10-17 | End: 2024-10-17 | Stop reason: HOSPADM

## 2024-10-17 RX ORDER — DOCUSATE SODIUM 100 MG/1
100 CAPSULE, LIQUID FILLED ORAL 2 TIMES DAILY PRN
Status: DISCONTINUED | OUTPATIENT
Start: 2024-10-17 | End: 2024-10-18 | Stop reason: HOSPADM

## 2024-10-17 RX ORDER — HYDROCODONE BITARTRATE AND ACETAMINOPHEN 5; 325 MG/1; MG/1
1 TABLET ORAL EVERY 4 HOURS PRN
Status: DISCONTINUED | OUTPATIENT
Start: 2024-10-17 | End: 2024-10-17 | Stop reason: HOSPADM

## 2024-10-17 RX ORDER — SODIUM CHLORIDE 0.9 % (FLUSH) 0.9 %
3 SYRINGE (ML) INJECTION AS NEEDED
Status: DISCONTINUED | OUTPATIENT
Start: 2024-10-17 | End: 2024-10-17 | Stop reason: HOSPADM

## 2024-10-17 RX ORDER — FENTANYL CITRATE 50 UG/ML
50 INJECTION, SOLUTION INTRAMUSCULAR; INTRAVENOUS
Status: COMPLETED | OUTPATIENT
Start: 2024-10-17 | End: 2024-10-17

## 2024-10-17 RX ORDER — ONDANSETRON 2 MG/ML
INJECTION INTRAMUSCULAR; INTRAVENOUS AS NEEDED
Status: DISCONTINUED | OUTPATIENT
Start: 2024-10-17 | End: 2024-10-17 | Stop reason: SURG

## 2024-10-17 RX ORDER — METOCLOPRAMIDE HYDROCHLORIDE 5 MG/ML
10 INJECTION INTRAMUSCULAR; INTRAVENOUS EVERY 6 HOURS PRN
Status: DISCONTINUED | OUTPATIENT
Start: 2024-10-17 | End: 2024-10-18 | Stop reason: HOSPADM

## 2024-10-17 RX ORDER — DROPERIDOL 2.5 MG/ML
0.62 INJECTION, SOLUTION INTRAMUSCULAR; INTRAVENOUS ONCE AS NEEDED
Status: DISCONTINUED | OUTPATIENT
Start: 2024-10-17 | End: 2024-10-17 | Stop reason: HOSPADM

## 2024-10-17 RX ORDER — PROMETHAZINE HYDROCHLORIDE 25 MG/1
12.5 TABLET ORAL EVERY 6 HOURS PRN
Status: DISCONTINUED | OUTPATIENT
Start: 2024-10-17 | End: 2024-10-18 | Stop reason: HOSPADM

## 2024-10-17 RX ORDER — SODIUM CHLORIDE 0.9 % (FLUSH) 0.9 %
1-10 SYRINGE (ML) INJECTION AS NEEDED
Status: DISCONTINUED | OUTPATIENT
Start: 2024-10-17 | End: 2024-10-17 | Stop reason: HOSPADM

## 2024-10-17 RX ORDER — ONDANSETRON 2 MG/ML
4 INJECTION INTRAMUSCULAR; INTRAVENOUS ONCE AS NEEDED
Status: DISCONTINUED | OUTPATIENT
Start: 2024-10-17 | End: 2024-10-17 | Stop reason: HOSPADM

## 2024-10-17 RX ORDER — SODIUM CHLORIDE 0.9 % (FLUSH) 0.9 %
3-10 SYRINGE (ML) INJECTION AS NEEDED
Status: DISCONTINUED | OUTPATIENT
Start: 2024-10-17 | End: 2024-10-17 | Stop reason: HOSPADM

## 2024-10-17 RX ORDER — HYDROCODONE BITARTRATE AND ACETAMINOPHEN 10; 325 MG/1; MG/1
1 TABLET ORAL EVERY 4 HOURS PRN
Status: DISCONTINUED | OUTPATIENT
Start: 2024-10-17 | End: 2024-10-18 | Stop reason: HOSPADM

## 2024-10-17 RX ORDER — PANTOPRAZOLE SODIUM 40 MG/1
40 TABLET, DELAYED RELEASE ORAL
Status: DISCONTINUED | OUTPATIENT
Start: 2024-10-18 | End: 2024-10-18 | Stop reason: HOSPADM

## 2024-10-17 RX ORDER — ONDANSETRON 2 MG/ML
4 INJECTION INTRAMUSCULAR; INTRAVENOUS EVERY 6 HOURS PRN
Status: DISCONTINUED | OUTPATIENT
Start: 2024-10-17 | End: 2024-10-18 | Stop reason: HOSPADM

## 2024-10-17 RX ORDER — NALOXONE HCL 0.4 MG/ML
0.1 VIAL (ML) INJECTION
Status: DISCONTINUED | OUTPATIENT
Start: 2024-10-17 | End: 2024-10-18 | Stop reason: HOSPADM

## 2024-10-17 RX ORDER — HYDROMORPHONE HYDROCHLORIDE 1 MG/ML
0.5 INJECTION, SOLUTION INTRAMUSCULAR; INTRAVENOUS; SUBCUTANEOUS
Status: DISCONTINUED | OUTPATIENT
Start: 2024-10-17 | End: 2024-10-18 | Stop reason: HOSPADM

## 2024-10-17 RX ORDER — SODIUM CHLORIDE 9 MG/ML
100 INJECTION, SOLUTION INTRAVENOUS CONTINUOUS
Status: DISCONTINUED | OUTPATIENT
Start: 2024-10-17 | End: 2024-10-17

## 2024-10-17 RX ORDER — GABAPENTIN 300 MG/1
600 CAPSULE ORAL EVERY 8 HOURS SCHEDULED
Status: DISCONTINUED | OUTPATIENT
Start: 2024-10-17 | End: 2024-10-18 | Stop reason: HOSPADM

## 2024-10-17 RX ORDER — LIDOCAINE HYDROCHLORIDE 40 MG/ML
SOLUTION TOPICAL AS NEEDED
Status: DISCONTINUED | OUTPATIENT
Start: 2024-10-17 | End: 2024-10-17 | Stop reason: SURG

## 2024-10-17 RX ORDER — LABETALOL HYDROCHLORIDE 5 MG/ML
5 INJECTION, SOLUTION INTRAVENOUS
Status: DISCONTINUED | OUTPATIENT
Start: 2024-10-17 | End: 2024-10-17 | Stop reason: HOSPADM

## 2024-10-17 RX ORDER — SODIUM CHLORIDE, SODIUM LACTATE, POTASSIUM CHLORIDE, CALCIUM CHLORIDE 600; 310; 30; 20 MG/100ML; MG/100ML; MG/100ML; MG/100ML
20 INJECTION, SOLUTION INTRAVENOUS CONTINUOUS
Status: DISCONTINUED | OUTPATIENT
Start: 2024-10-17 | End: 2024-10-17

## 2024-10-17 RX ORDER — FLUMAZENIL 0.1 MG/ML
0.2 INJECTION INTRAVENOUS AS NEEDED
Status: DISCONTINUED | OUTPATIENT
Start: 2024-10-17 | End: 2024-10-17 | Stop reason: HOSPADM

## 2024-10-17 RX ORDER — FENTANYL CITRATE 50 UG/ML
INJECTION, SOLUTION INTRAMUSCULAR; INTRAVENOUS AS NEEDED
Status: DISCONTINUED | OUTPATIENT
Start: 2024-10-17 | End: 2024-10-17 | Stop reason: SURG

## 2024-10-17 RX ORDER — SODIUM CHLORIDE, SODIUM LACTATE, POTASSIUM CHLORIDE, CALCIUM CHLORIDE 600; 310; 30; 20 MG/100ML; MG/100ML; MG/100ML; MG/100ML
100 INJECTION, SOLUTION INTRAVENOUS CONTINUOUS
Status: DISCONTINUED | OUTPATIENT
Start: 2024-10-17 | End: 2024-10-17

## 2024-10-17 RX ORDER — SODIUM CHLORIDE 0.9 % (FLUSH) 0.9 %
10 SYRINGE (ML) INJECTION EVERY 12 HOURS SCHEDULED
Status: DISCONTINUED | OUTPATIENT
Start: 2024-10-17 | End: 2024-10-17 | Stop reason: HOSPADM

## 2024-10-17 RX ORDER — SODIUM CHLORIDE 0.9 % (FLUSH) 0.9 %
3 SYRINGE (ML) INJECTION EVERY 12 HOURS SCHEDULED
Status: DISCONTINUED | OUTPATIENT
Start: 2024-10-17 | End: 2024-10-17 | Stop reason: HOSPADM

## 2024-10-17 RX ORDER — MAGNESIUM HYDROXIDE 1200 MG/15ML
LIQUID ORAL AS NEEDED
Status: DISCONTINUED | OUTPATIENT
Start: 2024-10-17 | End: 2024-10-17 | Stop reason: HOSPADM

## 2024-10-17 RX ORDER — LIDOCAINE HYDROCHLORIDE 20 MG/ML
INJECTION, SOLUTION EPIDURAL; INFILTRATION; INTRACAUDAL; PERINEURAL AS NEEDED
Status: DISCONTINUED | OUTPATIENT
Start: 2024-10-17 | End: 2024-10-17 | Stop reason: SURG

## 2024-10-17 RX ORDER — ONDANSETRON 4 MG/1
4 TABLET, ORALLY DISINTEGRATING ORAL EVERY 6 HOURS PRN
Status: DISCONTINUED | OUTPATIENT
Start: 2024-10-17 | End: 2024-10-18 | Stop reason: HOSPADM

## 2024-10-17 RX ORDER — SODIUM CHLORIDE 9 MG/ML
40 INJECTION, SOLUTION INTRAVENOUS AS NEEDED
Status: DISCONTINUED | OUTPATIENT
Start: 2024-10-17 | End: 2024-10-17

## 2024-10-17 RX ADMIN — LIDOCAINE HYDROCHLORIDE 1 EACH: 40 SOLUTION TOPICAL at 07:04

## 2024-10-17 RX ADMIN — OXYBUTYNIN CHLORIDE 5 MG: 5 TABLET, EXTENDED RELEASE ORAL at 14:01

## 2024-10-17 RX ADMIN — Medication 100 MCG: at 07:35

## 2024-10-17 RX ADMIN — LIDOCAINE HYDROCHLORIDE 100 MG: 20 INJECTION, SOLUTION EPIDURAL; INFILTRATION; INTRACAUDAL; PERINEURAL at 07:03

## 2024-10-17 RX ADMIN — PROPOFOL 150 MG: 10 INJECTION, EMULSION INTRAVENOUS at 07:03

## 2024-10-17 RX ADMIN — Medication 50 MCG: at 07:17

## 2024-10-17 RX ADMIN — FENTANYL CITRATE 50 MCG: 50 INJECTION, SOLUTION INTRAMUSCULAR; INTRAVENOUS at 09:00

## 2024-10-17 RX ADMIN — FENTANYL CITRATE 50 MCG: 50 INJECTION, SOLUTION INTRAMUSCULAR; INTRAVENOUS at 07:20

## 2024-10-17 RX ADMIN — FENTANYL CITRATE 50 MCG: 50 INJECTION, SOLUTION INTRAMUSCULAR; INTRAVENOUS at 07:03

## 2024-10-17 RX ADMIN — CEFAZOLIN 2000 MG: 2 INJECTION, POWDER, FOR SOLUTION INTRAMUSCULAR; INTRAVENOUS at 21:55

## 2024-10-17 RX ADMIN — Medication 100 MCG: at 07:09

## 2024-10-17 RX ADMIN — FENTANYL CITRATE 50 MCG: 50 INJECTION, SOLUTION INTRAMUSCULAR; INTRAVENOUS at 08:45

## 2024-10-17 RX ADMIN — CEFAZOLIN 2000 MG: 2 INJECTION, POWDER, FOR SOLUTION INTRAMUSCULAR; INTRAVENOUS at 07:10

## 2024-10-17 RX ADMIN — GABAPENTIN 600 MG: 300 CAPSULE ORAL at 14:01

## 2024-10-17 RX ADMIN — HYOSCYAMINE SULFATE 250 MCG: 0.12 TABLET SUBLINGUAL at 14:45

## 2024-10-17 RX ADMIN — SODIUM CHLORIDE, POTASSIUM CHLORIDE, SODIUM LACTATE AND CALCIUM CHLORIDE 20 ML/HR: 600; 310; 30; 20 INJECTION, SOLUTION INTRAVENOUS at 06:13

## 2024-10-17 RX ADMIN — ONDANSETRON 4 MG: 2 INJECTION INTRAMUSCULAR; INTRAVENOUS at 07:15

## 2024-10-17 RX ADMIN — HYDROCODONE BITARTRATE AND ACETAMINOPHEN 1 TABLET: 10; 325 TABLET ORAL at 08:45

## 2024-10-17 RX ADMIN — HYOSCYAMINE SULFATE 250 MCG: 0.12 TABLET SUBLINGUAL at 09:01

## 2024-10-17 RX ADMIN — SUGAMMADEX 200 MG: 100 INJECTION, SOLUTION INTRAVENOUS at 08:06

## 2024-10-17 RX ADMIN — GABAPENTIN 600 MG: 300 CAPSULE ORAL at 21:55

## 2024-10-17 RX ADMIN — ROCURONIUM 50 MG: 50 INJECTION, SOLUTION INTRAVENOUS at 07:03

## 2024-10-17 RX ADMIN — CEFAZOLIN 2000 MG: 2 INJECTION, POWDER, FOR SOLUTION INTRAMUSCULAR; INTRAVENOUS at 14:01

## 2024-10-17 RX ADMIN — DEXAMETHASONE SODIUM PHOSPHATE 4 MG: 4 INJECTION INTRA-ARTICULAR; INTRALESIONAL; INTRAMUSCULAR; INTRAVENOUS; SOFT TISSUE at 07:15

## 2024-10-17 NOTE — ANESTHESIA POSTPROCEDURE EVALUATION
"Patient: Mohit Holm    Procedure Summary       Date: 10/17/24 Room / Location:  [facility] OR  /  [facility]    Anesthesia Start: 0700 Anesthesia Stop: 0818    Procedure: TRANSURETHRAL PROSTATE AQUABLATION (Bladder) Diagnosis:       BPH with obstruction/lower urinary tract symptoms      (BPH with obstruction/lower urinary tract symptoms [N40.1, N13.8])    Surgeons: Alex Blackmon MD Provider: Lindsay Espinosa CRNA    Anesthesia Type: general ASA Status: 2            Anesthesia Type: general    Vitals  Vitals Value Taken Time   /80 10/17/24 1100   Temp 97.5 °F (36.4 °C) 10/17/24 1030   Pulse 50 10/17/24 1105   Resp 16 10/17/24 1100   SpO2 97 % 10/17/24 1105   Vitals shown include unfiled device data.        Post Anesthesia Care and Evaluation    Patient location during evaluation: PHASE II  Patient participation: complete - patient participated  Level of consciousness: awake and awake and alert  Pain score: 0  Pain management: adequate    Airway patency: patent  Anesthetic complications: No anesthetic complications  PONV Status: none  Cardiovascular status: acceptable  Respiratory status: acceptable  Hydration status: acceptable    Comments: Patient discharged according to acceptable Sri score per RN assessment. See nursing records for further information.     Blood pressure 134/80, pulse 52, temperature 97.5 °F (36.4 °C), temperature source Oral, resp. rate 16, height 182 cm (71.65\"), weight 114 kg (252 lb 3.3 oz), SpO2 97%.      "

## 2024-10-17 NOTE — PLAN OF CARE
Goal Outcome Evaluation:  Plan of Care Reviewed With: patient        Progress: no change  Outcome Evaluation: Pt A/Ox4, vitals stable. s/p aquablation, 3way angelo to traction with CBI, weaned and clamped off per Dr. Blackmon urine clear yellow no clots noted. scrotal swelling noted, MD aware, elevated scrotum. up x1 assist. SCDs for VTE prevention. Levsin given per order. safety maintained.

## 2024-10-17 NOTE — OP NOTE
Operative Summary    Mohit Holm  Date of Procedure: 10/17/2024    Pre-op Diagnosis:   BPH with obstruction/lower urinary tract symptoms [N40.1, N13.8]    Post-op Diagnosis:     Post-Op Diagnosis Codes:     * BPH with obstruction/lower urinary tract symptoms [N40.1, N13.8]    Procedure/CPT® Codes:      Procedure(s):  TRANSURETHRAL PROSTATE AQUABLATION    Surgeon(s):  Alex Blackmon MD    Anesthesia: General    Staff:   Circulator: Anjelica Mckinney RN  Scrub Person: Colin Marroquin; Radha Croft    Indications for procedure:  BPH with lower urinary tract symptoms    Findings:   40 cc prostate  Wide open prostatic fossa  No ureteral orifice or external sphincter injury  First passed 3 minutes 19 seconds  Second pass 3 minutes 10 seconds    Procedure details:  After appropriate anesthesia, positioning, prep and drape, timeout protocol was observed.     The TRUS (transrectal ultrasound) Stepper was mounted to the articulating arm and secured to the operating room bed.  The ultrasound probe was attached to the stepper.  The ultrasound probe was aligned, and confirmation made that the prostate is centered and aligned using both transverse and sagittal views.  The bladder neck, the verumontanum, and the central/transition zones are identified.    The 24F AQUABEAM Handpiece was inserted into the prostatic urethra and a complete cystoscopic evaluation was performed by inspecting the prostate, bladder, and identifying the location of the verumontanum/external sphincter.  The AQUABEAM handpiece was secured to the handpiece articulating arm.  At this point I confirmed that the alignment of the AQUABEAM handpiece and the transrectal sound probe were parallel and linear.  Confirmation that the AQUABEAM nozzle is centered and anterior at the bladder neck .  The cystoscope was then retracted to visualize the verumontanum and the external sphincter.  The tip of the cystoscope was positioned just proximal to the  "external sphincter.  I then reconfirm the alignment of the TRUS probe with the AQUABEAM handpiece and compression was applied with the TRUS probe to improve imaging of the prostate.  At this point horizontal alignment of the handpiece water jet nozzle was performed.    The AQUABLATION treatment zones were planned utilizing real-time transrectal ultrasound to visualize the contour of the prostate, the depth and radial angles of resection as defined in the transverse view.  In the sagittal view, the aquablation nozzle was identified in position and registered with the software.  The treatment contours were then adjusted to conform to the intended resection margins.  Any median lobe tissue, bladder neck and verumontanum were marked and confirmed in the treatment contour.    The aquablation treatment is then started following resection contour confirmed under ultrasound guidance.  Adjustments were made as needed with regard to the power of the water jet.  Total aquablation resection time was 6 minutes 29 seconds minutes.     Once the aquablation resection was complete, cystoscopy is carried out again.  Tissue and clot evacuation were performed using a 27 French ACMI cystoscope/resectoscope.  Cautery was used for hemostasis.  This required resection of some of the \"fluffy\" tissue ablated by aquablation.    At this point a 24 French three-way Youssef catheter with 60 mL in the balloon is confirmed in its position by TRUS.  Patient was then transferred to the recovery room.     Estimated Blood Loss: 50 cc    Specimens:                Specimens       ID Source Type Tests Collected By Collected At Frozen?    A Prostate Tissue TISSUE PATHOLOGY EXAM   Alex Blackmon MD 10/17/24 0802     Description: PROSTATE CHIPS              Drains: 24 French three-way Youssef catheter    Complications: none    Plan: PACU to floor on continuous bladder irrigation      (Please note that portions of this note were completed with a voice " recognition program.)  Alex Blackmon MD     Date: 10/17/2024  Time: 08:21 CDT

## 2024-10-17 NOTE — ANESTHESIA PROCEDURE NOTES
Airway  Urgency: elective    Date/Time: 10/17/2024 7:04 AM  Airway not difficult    General Information and Staff    Patient location during procedure: OR  CRNA/CAA: Lindsay Espinosa CRNA    Indications and Patient Condition  Indications for airway management: airway protection    Preoxygenated: yes  Mask difficulty assessment: 2 - vent by mask + OA or adjuvant +/- NMBA    Final Airway Details  Final airway type: endotracheal airway      Successful airway: ETT  Cuffed: yes   Successful intubation technique: direct laryngoscopy and video laryngoscopy  Facilitating devices/methods: intubating stylet  Endotracheal tube insertion site: oral  Blade: Angeles  Blade size: 4  ETT size (mm): 8.0  Cormack-Lehane Classification: grade I - full view of glottis  Placement verified by: chest auscultation and capnometry   Cuff volume (mL): 8  Measured from: lips  ETT/EBT  to lips (cm): 23  Number of attempts at approach: 1  Assessment: lips, teeth, and gum same as pre-op and atraumatic intubation

## 2024-10-17 NOTE — ANESTHESIA PREPROCEDURE EVALUATION
Anesthesia Evaluation     Patient summary reviewed   no history of anesthetic complications:   NPO Solid Status: > 8 hours             Airway   Mallampati: I  TM distance: >3 FB  Neck ROM: full  No difficulty expected  Dental          Pulmonary    (+) a smoker Former, COPD,  (-) asthma, sleep apnea  Cardiovascular   Exercise tolerance: good (4-7 METS)    (+) dysrhythmias  (-) hypertension, past MI, cardiac stents, hyperlipidemia      Neuro/Psych  (-) seizures, TIA, CVA  GI/Hepatic/Renal/Endo    (+) obesity  (-) liver disease, no renal disease, diabetes    Musculoskeletal     Abdominal    Substance History      OB/GYN          Other      history of cancer (colon) remission                Anesthesia Plan    ASA 2     general     intravenous induction     Anesthetic plan, risks, benefits, and alternatives have been provided, discussed and informed consent has been obtained with: patient.    CODE STATUS:

## 2024-10-18 VITALS
HEIGHT: 72 IN | SYSTOLIC BLOOD PRESSURE: 120 MMHG | OXYGEN SATURATION: 95 % | BODY MASS INDEX: 34.16 KG/M2 | HEART RATE: 60 BPM | WEIGHT: 252.21 LBS | RESPIRATION RATE: 18 BRPM | DIASTOLIC BLOOD PRESSURE: 61 MMHG | TEMPERATURE: 98.6 F

## 2024-10-18 LAB
ANION GAP SERPL CALCULATED.3IONS-SCNC: 9 MMOL/L (ref 5–15)
BUN SERPL-MCNC: 14 MG/DL (ref 8–23)
BUN/CREAT SERPL: 15.4 (ref 7–25)
CALCIUM SPEC-SCNC: 8.6 MG/DL (ref 8.6–10.5)
CHLORIDE SERPL-SCNC: 101 MMOL/L (ref 98–107)
CO2 SERPL-SCNC: 29 MMOL/L (ref 22–29)
CREAT SERPL-MCNC: 0.91 MG/DL (ref 0.76–1.27)
DEPRECATED RDW RBC AUTO: 45.3 FL (ref 37–54)
EGFRCR SERPLBLD CKD-EPI 2021: 89.5 ML/MIN/1.73
ERYTHROCYTE [DISTWIDTH] IN BLOOD BY AUTOMATED COUNT: 13.8 % (ref 12.3–15.4)
GLUCOSE SERPL-MCNC: 137 MG/DL (ref 65–99)
HCT VFR BLD AUTO: 38.4 % (ref 37.5–51)
HGB BLD-MCNC: 12.5 G/DL (ref 13–17.7)
MCH RBC QN AUTO: 29 PG (ref 26.6–33)
MCHC RBC AUTO-ENTMCNC: 32.6 G/DL (ref 31.5–35.7)
MCV RBC AUTO: 89.1 FL (ref 79–97)
PLATELET # BLD AUTO: 259 10*3/MM3 (ref 140–450)
PMV BLD AUTO: 9.3 FL (ref 6–12)
POTASSIUM SERPL-SCNC: 4.2 MMOL/L (ref 3.5–5.2)
RBC # BLD AUTO: 4.31 10*6/MM3 (ref 4.14–5.8)
SODIUM SERPL-SCNC: 139 MMOL/L (ref 136–145)
WBC NRBC COR # BLD AUTO: 12.22 10*3/MM3 (ref 3.4–10.8)

## 2024-10-18 PROCEDURE — A9270 NON-COVERED ITEM OR SERVICE: HCPCS | Performed by: UROLOGY

## 2024-10-18 PROCEDURE — 99238 HOSP IP/OBS DSCHRG MGMT 30/<: CPT | Performed by: UROLOGY

## 2024-10-18 PROCEDURE — 63710000001 GABAPENTIN 300 MG CAPSULE: Performed by: UROLOGY

## 2024-10-18 PROCEDURE — 85027 COMPLETE CBC AUTOMATED: CPT | Performed by: UROLOGY

## 2024-10-18 PROCEDURE — 80048 BASIC METABOLIC PNL TOTAL CA: CPT | Performed by: UROLOGY

## 2024-10-18 PROCEDURE — 63710000001 PANTOPRAZOLE 40 MG TABLET DELAYED-RELEASE: Performed by: UROLOGY

## 2024-10-18 PROCEDURE — 63710000001 OXYBUTYNIN XL 5 MG TABLET SUSTAINED-RELEASE 24 HOUR: Performed by: UROLOGY

## 2024-10-18 RX ORDER — CEPHALEXIN 500 MG/1
500 CAPSULE ORAL 3 TIMES DAILY
Qty: 9 CAPSULE | Refills: 0 | Status: SHIPPED | OUTPATIENT
Start: 2024-10-18 | End: 2024-10-21

## 2024-10-18 RX ADMIN — PANTOPRAZOLE SODIUM 40 MG: 40 TABLET, DELAYED RELEASE ORAL at 06:14

## 2024-10-18 RX ADMIN — GABAPENTIN 600 MG: 300 CAPSULE ORAL at 06:14

## 2024-10-18 RX ADMIN — OXYBUTYNIN CHLORIDE 5 MG: 5 TABLET, EXTENDED RELEASE ORAL at 08:09

## 2024-10-18 NOTE — PLAN OF CARE
Goal Outcome Evaluation:  Plan of Care Reviewed With: patient        Progress: improving  Outcome Evaluation: Pt A/Ox4, vitals stable. s/p aquablation, 3way angelo CBI clamped since yesterday afternoon, urine clear yellow.  up stand by . SCDs for VTE prevention. no c/o pain. safety maintained.   angelo care provided, and pt educated on angelo care at home, how to change drainage bags, changed to leg bag. discharge instructions given and reviewed with pt, he verbalized understanding. pt is ready to discharge home.

## 2024-10-18 NOTE — PLAN OF CARE
Goal Outcome Evaluation:           Progress: no change  Outcome Evaluation: No c/o so far this shift, angelo in place with pink tinged clear urine, ambulating, brigitte diet

## 2024-10-18 NOTE — DISCHARGE INSTRUCTIONS
Please call MD for Temperature more than 101.5, nausea vomiting, catheter not draining, blood clots in Youssef.  No driving while the catheter is in.  No lifting greater than 20 pounds for 2 weeks.  Please take your medications as prescribed.  Dr. Blackmon will contact you regarding final pathology results.

## 2024-10-18 NOTE — DISCHARGE SUMMARY
Date of Discharge:  10/18/2024    Discharge Diagnosis: BPH with urinary obstruction    Presenting Problem/History of Present Illness  BPH with obstruction/lower urinary tract symptoms [N40.1, N13.8]       Hospital Course  73-year-old male who had urinary tract symptoms that were nonresponsive to medical therapy.  He had a 40 cc prostate on TRUS with lateral lobe hypertrophy of the prostate on cystoscopy.  He was brought the operating room for aqua ablation.  He underwent this on 10/17/2024.  He was transferred to floor.  Postoperative day #0 his irrigation was stopped.  His urine remained clear overnight.  He is Youssef was taken off traction and the patient was discharged home after receiving appropriate Youssef care catheter teaching.    Procedures Performed  Procedure(s):  TRANSURETHRAL PROSTATE AQUABLATION       Consults:   Consults       No orders found for last 30 day(s).              Condition on Discharge: Good    Vital Signs  Temp:  [97.4 °F (36.3 °C)-98.4 °F (36.9 °C)] 98 °F (36.7 °C)  Heart Rate:  [48-79] 57  Resp:  [14-18] 16  BP: (100-144)/(61-89) 132/70    Discharge Disposition  Home or Self Care    Discharge Medications     Discharge Medications        New Medications        Instructions Start Date   cephalexin 500 MG capsule  Commonly known as: KEFLEX   500 mg, Oral, 3 Times Daily, Start day prior to cath removal      hyoscyamine 0.125 MG SL tablet  Commonly known as: Levsin/SL   0.125 mg, Oral, Every 4 Hours PRN             Continue These Medications        Instructions Start Date   Belbuca 600 MCG film  Generic drug: Buprenorphine HCl   Place 1 film under the tongue 2 (Two) Times a Day.      celecoxib 100 MG capsule  Commonly known as: CeleBREX   Take 1 capsule by mouth Daily.      esomeprazole 40 MG capsule  Commonly known as: nexIUM   40 mg, Oral, Every Morning Before Breakfast      gabapentin 600 MG tablet  Commonly known as: NEURONTIN   600 mg, 3 Times Daily      PreviDent 5000 Booster Plus 1.1 %  paste  Generic drug: Sodium Fluoride   Take 1 Application by mouth Daily.      Trelegy Ellipta 100-62.5-25 MCG/INH inhaler  Generic drug: Fluticasone-Umeclidin-Vilant   1 puff, Daily               Discharge Diet:     Activity at Discharge:     Follow-up Appointments  No future appointments.    Monday nursing visit for cath removal  6 weeks Dr. Blackmon  Test Results Pending at Discharge  Pending Labs       Order Current Status    Tissue Pathology Exam In process             Alex Blackmon MD  10/18/24  07:31 CDT    Time:  Less than 30 minutes

## 2024-10-21 ENCOUNTER — OFFICE VISIT (OUTPATIENT)
Dept: UROLOGY | Facility: CLINIC | Age: 74
End: 2024-10-21
Payer: MEDICARE

## 2024-10-21 ENCOUNTER — TELEPHONE (OUTPATIENT)
Dept: UROLOGY | Facility: CLINIC | Age: 74
End: 2024-10-21
Payer: MEDICARE

## 2024-10-21 VITALS — HEART RATE: 76 BPM | DIASTOLIC BLOOD PRESSURE: 86 MMHG | OXYGEN SATURATION: 93 % | SYSTOLIC BLOOD PRESSURE: 150 MMHG

## 2024-10-21 DIAGNOSIS — N13.8 BPH WITH OBSTRUCTION/LOWER URINARY TRACT SYMPTOMS: Primary | ICD-10-CM

## 2024-10-21 DIAGNOSIS — N40.1 BPH WITH OBSTRUCTION/LOWER URINARY TRACT SYMPTOMS: Primary | ICD-10-CM

## 2024-10-21 LAB
CYTO UR: NORMAL
LAB AP CASE REPORT: NORMAL
LAB AP SPECIAL STAINS: NORMAL
Lab: NORMAL
PATH REPORT.FINAL DX SPEC: NORMAL
PATH REPORT.GROSS SPEC: NORMAL

## 2024-10-21 PROCEDURE — 99211 OFF/OP EST MAY X REQ PHY/QHP: CPT | Performed by: PHYSICIAN ASSISTANT

## 2024-10-21 NOTE — PROGRESS NOTES
Mohit Holm is a 73 y.o. male who is here today for catheter removal. Patient of Dr. Blackmon. 10cc syringe used to deflate the balloon and the catheter was removed without difficulty. The patient tolerated this well and will return in 6 weeks to see Dr. Blackmon in the office for follow up. Juan Peng PA-C was in the office at the time of procedure.     Patient was advised to drink clear fluids for the next couple hours and urinate. The patient was also advised he may experience some blood in the urine and burning with urination for the next couple days. If the patient is unable to urinate or develops fever, chills, N&V or suprapubic pain he will call to return for an appt at clinic or seek medical treatment at [facility] ER, PCP or Urgent Care after hours. Patient verbalized understanding and all questions were answered. DAVINA Hernandez RN.     I have reviewed and agree with medical assistance documentation above

## 2024-11-20 NOTE — PROGRESS NOTES
Subjective    Mr. Holm is 74 y.o. male    Chief Complaint: Post Op Aquablation    History of Present Illness  Patient is status post aqua ablation 10/17/2024.  Patient had a 40 cc prostate.  Preoperative AUA score 27/35.  Patient failed dual therapy with Flomax and finasteride.  AUA 4/35 with urgency, nocturia X 1. Hematuria resolved.  Denies dysuria.      The following portions of the patient's history were reviewed and updated as appropriate: allergies, current medications, past family history, past medical history, past social history, past surgical history and problem list.    Review of Systems      Current Outpatient Medications:     Belbuca 600 MCG film, Place 1 film under the tongue 2 (Two) Times a Day., Disp: , Rfl:     celecoxib (CeleBREX) 100 MG capsule, Take 1 capsule by mouth Daily., Disp: , Rfl:     esomeprazole (nexIUM) 40 MG capsule, Take 1 capsule by mouth Every Morning Before Breakfast., Disp: , Rfl:     gabapentin (NEURONTIN) 600 MG tablet, Take 1 tablet by mouth 3 (Three) Times a Day., Disp: , Rfl:     hyoscyamine (Levsin/SL) 0.125 MG SL tablet, Take 1 tablet by mouth Every 4 (Four) Hours As Needed (bladder spasms)., Disp: 21 tablet, Rfl: 1    PreviDent 5000 Booster Plus 1.1 % paste, Take 1 Application by mouth Daily., Disp: , Rfl:     Trelegy Ellipta 100-62.5-25 MCG/INH aerosol powder , Inhale 1 puff Daily., Disp: , Rfl:     Past Medical History:   Diagnosis Date    Arrhythmia     EXTRA BEAT    Arthritis     Back pain     PAIN ALBERT./ INJECTIONS/ABLATION    BPH with obstruction/lower urinary tract symptoms 09/27/2024    Cancer     colon    COPD (chronic obstructive pulmonary disease)     Erectile dysfunction 2010    Numbness of foot     Bilateral     Urinary incontinence 2024       Past Surgical History:   Procedure Laterality Date    BACK SURGERY  1998    CARDIAC CATHETERIZATION N/A 12/10/2020    Procedure: Coronary angiography;  Surgeon: Lonny Wesley MD;  Location:  [facility]  "LOCATION;  Service: Cardiology;  Laterality: N/A;    COLON RESECTION      COLONOSCOPY      ENDOSCOPY      EYE SURGERY Bilateral     CATARACTS    PROSTATE AQUABLATION N/A 10/17/2024    Procedure: TRANSURETHRAL PROSTATE AQUABLATION;  Surgeon: Alex Blackmon MD;  Location: [facility] OR;  Service: Robotics - Urology;  Laterality: N/A;    REPLACEMENT TOTAL KNEE Right        Social History     Socioeconomic History    Marital status:    Tobacco Use    Smoking status: Former     Current packs/day: 0.00     Types: Cigarettes     Quit date:      Years since quittin.9    Smokeless tobacco: Never   Vaping Use    Vaping status: Never Used   Substance and Sexual Activity    Alcohol use: Not Currently     Comment: quit     Drug use: Never    Sexual activity: Not Currently     Partners: Female       Family History   Problem Relation Age of Onset    Lung disease Mother     Heart disease Father     No Known Problems Sister     No Known Problems Brother     No Known Problems Sister     No Known Problems Brother        Objective    Ht 182.9 cm (72\")   Wt 113 kg (250 lb)   BMI 33.91 kg/m²     Physical Exam      Microscopic Urinalysis  I inspected the urine myself based on the clinical situation including the dipstick urine. The urine is spun in a centrifuge for three minutes. The spun urine shows 12-20 rbc/hpf, 3-6 wbc/hpf, none epi/hpf, negative bacteria, negative crystals, and negative casts.     Results for orders placed or performed in visit on 24   POC Urinalysis Dipstick, Multipro    Collection Time: 24  9:53 AM    Specimen: Urine   Result Value Ref Range    Color Yellow Yellow, Straw, Dark Yellow, Zoie    Clarity, UA Clear Clear    Glucose, UA Negative Negative mg/dL    Bilirubin Negative Negative    Ketones, UA Negative Negative    Specific Gravity  1.005 1.005 - 1.030    Blood, UA Moderate (A) Negative    pH, Urine 5.5 5.0 - 8.0    Protein, POC Negative Negative mg/dL    Urobilinogen, UA " 0.2 E.U./dL Normal, 0.2 E.U./dL    Nitrite, UA Negative Negative    Leukocytes Small (1+) (A) Negative     IPSS Questionnaire (AUA-7):  Incomplete emptying  Over the past month, how often have you had a sensation of not emptying your bladder completely after you finished urinating?: Not at all (12/02/24 0951)  Frequency  Over the past month, how often have you had to urinate again less than two hours after you finishied urinating ?: Not at all (12/02/24 0951)  Intermittency  Over the past month, how often have you found you stopped and started again several time when you urinated ?: Not at all (12/02/24 0951)  Urgency  Over the last month, how often have you found it difficult  have you found it difficult to postpone urination ?: More than half the time (12/02/24 0951)  Weak Stream  Over the past month, how often have you had a weak urinary stream ?: Not at all (12/02/24 0951)  Straining  Over the past month, how often have you had to push or strain to begin urination ?: Not at all (12/02/24 0951)  Nocturia  Over the past month, how many times did you most typically get up to urinate from the time you went to bed until the time you got up in the morning ?: 1 time (12/02/24 0951)  Quality of life due to urinary symptoms  If you were to spend the rest of your life with your urinary condition the way it is now, how would feel about that?: Pleased (12/02/24 0951)    Scores  Total IPSS Score: 5 (12/02/24 0951)  Total Score = Symptomatic Level: Mildly symptomatic: 0-7 (12/02/24 0951)      Estimation of residual urine via abdominal ultrasound  Residual Urine: 53 ml  Indication: BPH  Position: Supine  Examination: Incremental scanning of the suprapubic area using 3 MHz transducer using copious amounts of acoustic gel.   Findings: An anechoic area was demonstrated which represented the bladder, with measurement of residual urine as noted. I inspected this myself. In that the residual urine was stable or insignificant, no  treatment will be necessary at this time.       Assessment and Plan    Diagnoses and all orders for this visit:    1. BPH with obstruction/lower urinary tract symptoms (Primary)  -     POC Urinalysis Dipstick, Multipro          Patient doing very well status post aqua ablation.  He is cleared to perform any activity in the future.  I will have him follow-up to return to see me on a as needed basis.

## 2024-12-02 ENCOUNTER — OFFICE VISIT (OUTPATIENT)
Dept: UROLOGY | Facility: CLINIC | Age: 74
End: 2024-12-02
Payer: MEDICARE

## 2024-12-02 VITALS — BODY MASS INDEX: 33.86 KG/M2 | HEIGHT: 72 IN | WEIGHT: 250 LBS

## 2024-12-02 DIAGNOSIS — N40.1 BPH WITH OBSTRUCTION/LOWER URINARY TRACT SYMPTOMS: Primary | ICD-10-CM

## 2024-12-02 DIAGNOSIS — N13.8 BPH WITH OBSTRUCTION/LOWER URINARY TRACT SYMPTOMS: Primary | ICD-10-CM

## 2024-12-02 LAB
BILIRUB BLD-MCNC: NEGATIVE MG/DL
CLARITY, POC: CLEAR
COLOR UR: YELLOW
GLUCOSE UR STRIP-MCNC: NEGATIVE MG/DL
KETONES UR QL: NEGATIVE
LEUKOCYTE EST, POC: ABNORMAL
NITRITE UR-MCNC: NEGATIVE MG/ML
PH UR: 5.5 [PH] (ref 5–8)
PROT UR STRIP-MCNC: NEGATIVE MG/DL
RBC # UR STRIP: ABNORMAL /UL
SP GR UR: 1 (ref 1–1.03)
UROBILINOGEN UR QL: ABNORMAL

## 2024-12-02 PROCEDURE — 81001 URINALYSIS AUTO W/SCOPE: CPT | Performed by: UROLOGY

## 2024-12-02 PROCEDURE — 99213 OFFICE O/P EST LOW 20 MIN: CPT | Performed by: UROLOGY

## 2024-12-02 PROCEDURE — 1159F MED LIST DOCD IN RCRD: CPT | Performed by: UROLOGY

## 2024-12-02 PROCEDURE — 51798 US URINE CAPACITY MEASURE: CPT | Performed by: UROLOGY

## 2024-12-02 PROCEDURE — 1160F RVW MEDS BY RX/DR IN RCRD: CPT | Performed by: UROLOGY

## (undated) DEVICE — MODEL AT P65, P/N 701554-001KIT CONTENTS: HAND CONTROLLER, 3-WAY HIGH-PRESSURE STOPCOCK WITH ROTATING END AND PREMIUM HIGH-PRESSURE TUBING: Brand: ANGIOTOUCH® KIT

## (undated) DEVICE — PK DRP AQUABEAM LITHO 65X69IN

## (undated) DEVICE — UNDERGLOVE SURG SZ 8 FNGR THK0.21MIL GRN LTX BEAD CUF

## (undated) DEVICE — FAN SPRAY KIT: Brand: PULSAVAC®

## (undated) DEVICE — ELECTRD PAD DEFIB A/

## (undated) DEVICE — TOTAL TRAY, 16FR 10ML SIL FOLEY, URN: Brand: MEDLINE

## (undated) DEVICE — SOLIDIFIER LIQUI LOC PLUS 2000CC

## (undated) DEVICE — PINNACLE INTRODUCER SHEATH: Brand: PINNACLE

## (undated) DEVICE — DOVER HYDROGEL COATED LATEX FOLEY CATHETER, 30 ML, 3-WAY 24 FR/CH (8.0 MM): Brand: DOVER

## (undated) DEVICE — PAD,ARMBOARD,CONV,FOAM,2X8X20",12PR/CS: Brand: MEDLINE

## (undated) DEVICE — BAG,DRAINAGE,4L,A/R TOWER,LL,SLIDE TAP: Brand: MEDLINE

## (undated) DEVICE — PK CATH CARD 30 CA/4

## (undated) DEVICE — MODEL BT2000 P/N 700287-012KIT CONTENTS: MANIFOLD WITH SALINE AND CONTRAST PORTS, SALINE TUBING WITH SPIKE AND HAND SYRINGE, TRANSDUCER: Brand: BT2000 AUTOMATED MANIFOLD KIT

## (undated) DEVICE — STERILE POLYISOPRENE POWDER-FREE SURGICAL GLOVES: Brand: PROTEXIS

## (undated) DEVICE — CHLORAPREP 26ML ORANGE

## (undated) DEVICE — SUTURE VCRL SZ 2-0 L27IN ABSRB UD L26MM SH 1/2 CIR J417H

## (undated) DEVICE — COVER,MAYO STAND,STERILE: Brand: MEDLINE

## (undated) DEVICE — GLOVE SURG SZ 85 L12IN FNGR THK79MIL GRN LTX FREE

## (undated) DEVICE — ELECTRD SUPERSECT FRNT LOAD 5PK

## (undated) DEVICE — ANGIO-SEAL VIP VASCULAR CLOSURE DEVICE: Brand: ANGIO-SEAL

## (undated) DEVICE — Device: Brand: AQUABEAM HANDPIECE

## (undated) DEVICE — SURGICAL PROCEDURE PACK KNEE TOT DBD CDS LOURDES HOSP LF

## (undated) DEVICE — CATH DIAG IMPULSE M/ PK 145 5FR

## (undated) DEVICE — NON-WOVEN ADHESIVE WOUND DRESSING: Brand: PRIMAPORE ADHESIVE DRESSING 30*10CM

## (undated) DEVICE — TUBING, SUCTION, 1/4" X 12', STRAIGHT: Brand: MEDLINE

## (undated) DEVICE — ZIMMER® STERILE DISPOSABLE TOURNIQUET CUFF WITH PLC, DUAL PORT, SINGLE BLADDER, 34 IN. (86 CM)

## (undated) DEVICE — GW STARTER FXD CORE J .035 3X150CM 3MM

## (undated) DEVICE — GLOVE SURG SZ 85 L12IN FNGR ORTHO 126MIL CRM LTX FREE

## (undated) DEVICE — A2000 MULTI-USE SYRINGE KIT, P/N 701277-003KIT CONTENTS: 100ML CONTRAST RESERVOIR AND TUBING WITH CONTRAST SPIKE AND CLAMP: Brand: A2000 MULTI-USE SYRINGE KIT

## (undated) DEVICE — SYRINGE,PISTON,IRRIGATION,60ML,STERILE: Brand: MEDLINE

## (undated) DEVICE — SYSTEM SKIN CLSR 22CM DERMBND PRINEO

## (undated) DEVICE — SUTURE VCRL SZ 1 L18IN ABSRB UD L36MM CT-1 1/2 CIR J841D

## (undated) DEVICE — GLV SURG BIOGEL M LTX PF 7 1/2

## (undated) DEVICE — 3M™ STERI-DRAPE™ INSTRUMENT POUCH 1018: Brand: STERI-DRAPE™

## (undated) DEVICE — TRAY EPI 25GA L3.5IN 0.75% BIPIVCAIN 8.25% D CONTAIN BPA

## (undated) DEVICE — SOLUTION IV IRRIG POUR BRL 0.9% SODIUM CHL 2F7124

## (undated) DEVICE — PK CYSTO 30

## (undated) DEVICE — GOWN,PRECEPT,XLNG/XXLARGE,STRL: Brand: MEDLINE

## (undated) DEVICE — SUTURE VCRL SZ 2-0 L36IN ABSRB UD L36MM CT-1 1/2 CIR J945H

## (undated) DEVICE — Z INACTIVE USE 2660664 SOLUTION IRRIG 3000ML 0.9% SOD CHL USP UROMATIC PLAS CONT

## (undated) DEVICE — SYR LUERLOK 30CC

## (undated) DEVICE — Z DISCONTINUED USE 2272117 DRAPE SURG 3 QTR N INVASIVE 2 LAYR DISP

## (undated) DEVICE — DUAL CUT SAGITTAL BLADE

## (undated) DEVICE — EVAC BLDR UROVAC W ADAPT

## (undated) DEVICE — 4-PORT MANIFOLD: Brand: NEPTUNE 2

## (undated) DEVICE — GLOVE SURG SZ 85 CRM LTX FREE POLYISOPRENE POLYMER BEAD ANTI

## (undated) DEVICE — SOL IRR NACL 0.9PCT BT 1000ML

## (undated) DEVICE — CANN CO2/O2 NASL A/

## (undated) DEVICE — BLADE RMR L51MM PAT PILOT H

## (undated) DEVICE — BLADE SAW W12.5XL70MM THK1MM RECIP DBL SIDE OFFSET

## (undated) DEVICE — Device: Brand: POWER-FLO®